# Patient Record
Sex: FEMALE | Race: WHITE | NOT HISPANIC OR LATINO | Employment: FULL TIME | URBAN - METROPOLITAN AREA
[De-identification: names, ages, dates, MRNs, and addresses within clinical notes are randomized per-mention and may not be internally consistent; named-entity substitution may affect disease eponyms.]

---

## 2023-06-13 ENCOUNTER — OFFICE VISIT (OUTPATIENT)
Dept: PRIMARY CARE | Facility: CLINIC | Age: 58
End: 2023-06-13
Payer: COMMERCIAL

## 2023-06-13 VITALS
BODY MASS INDEX: 25.36 KG/M2 | WEIGHT: 152.4 LBS | SYSTOLIC BLOOD PRESSURE: 110 MMHG | TEMPERATURE: 97.3 F | DIASTOLIC BLOOD PRESSURE: 70 MMHG

## 2023-06-13 DIAGNOSIS — G90.A POSTURAL ORTHOSTATIC TACHYCARDIA SYNDROME: ICD-10-CM

## 2023-06-13 DIAGNOSIS — G43.909 MIGRAINE WITHOUT STATUS MIGRAINOSUS, NOT INTRACTABLE, UNSPECIFIED MIGRAINE TYPE: ICD-10-CM

## 2023-06-13 DIAGNOSIS — E78.5 BORDERLINE HYPERLIPIDEMIA: ICD-10-CM

## 2023-06-13 DIAGNOSIS — R73.01 IMPAIRED FASTING BLOOD SUGAR: Primary | ICD-10-CM

## 2023-06-13 PROBLEM — J30.2 SEASONAL ALLERGIES: Status: ACTIVE | Noted: 2023-06-13

## 2023-06-13 PROBLEM — I83.899 VARICOSE VEINS WITH SWELLING: Status: ACTIVE | Noted: 2023-06-13

## 2023-06-13 PROCEDURE — 1036F TOBACCO NON-USER: CPT | Performed by: INTERNAL MEDICINE

## 2023-06-13 PROCEDURE — 99214 OFFICE O/P EST MOD 30 MIN: CPT | Performed by: INTERNAL MEDICINE

## 2023-06-13 RX ORDER — CYCLOSPORINE 0.5 MG/ML
EMULSION OPHTHALMIC
COMMUNITY
Start: 2022-08-05 | End: 2023-11-07 | Stop reason: WASHOUT

## 2023-06-13 RX ORDER — AZELASTINE 1 MG/ML
2 SPRAY, METERED NASAL 2 TIMES DAILY
COMMUNITY
End: 2023-06-30

## 2023-06-13 RX ORDER — CHOLECALCIFEROL (VITAMIN D3) 25 MCG
1000 TABLET ORAL
COMMUNITY
End: 2023-06-29 | Stop reason: WASHOUT

## 2023-06-13 RX ORDER — IBUPROFEN 100 MG/5ML
SUSPENSION, ORAL (FINAL DOSE FORM) ORAL
COMMUNITY
Start: 2020-06-04

## 2023-06-13 RX ORDER — ATENOLOL 25 MG/1
1 TABLET ORAL DAILY
COMMUNITY
Start: 2021-05-29 | End: 2023-11-07 | Stop reason: WASHOUT

## 2023-06-13 ASSESSMENT — PATIENT HEALTH QUESTIONNAIRE - PHQ9
2. FEELING DOWN, DEPRESSED OR HOPELESS: NOT AT ALL
SUM OF ALL RESPONSES TO PHQ9 QUESTIONS 1 AND 2: 0
1. LITTLE INTEREST OR PLEASURE IN DOING THINGS: NOT AT ALL

## 2023-06-13 NOTE — PROGRESS NOTES
Subjective   Patient ID: Pia Flores is a 57 y.o. female who presents for 6 month follow up.    HPI   Overall well   #1 POTS-overall doing well. Continue treatment. f/u cards  #2 varicose veins-continue compression stockings.   #3 rotator cuff/biceps tendinitis-resolved.  #4 migraines- resolved  #5 rt posterior leg pain- c/s ortho. Dermal exam  #6 increased LDL- +fhx CAD, excellent CACS (=0 in 2018). modestly reviewed diet and exercise. Recommend beginning a statin. She would like to try 6 months of lifestyle. We will follow-up in 6 months.  #7 neck/dizziness- better after PT--> home exercises. repeat PT INB.   #8 cataracts- s/p bl repair.   #9 IFBS-     Review of Systems   All other systems reviewed and are negative.      Objective   /70 (BP Location: Left arm, Patient Position: Sitting, BP Cuff Size: Adult)   Temp 36.3 °C (97.3 °F) (Skin)   Wt 69.1 kg (152 lb 6.4 oz)   BMI 25.36 kg/m²     Physical Exam  Constitutional:       Appearance: Normal appearance.   Cardiovascular:      Rate and Rhythm: Normal rate and regular rhythm.      Pulses: Normal pulses.      Heart sounds: Normal heart sounds. No murmur heard.  Pulmonary:      Effort: Pulmonary effort is normal. No respiratory distress.      Breath sounds: Normal breath sounds. No wheezing, rhonchi or rales.   Neurological:      Mental Status: She is alert.   Psychiatric:         Mood and Affect: Mood normal.         Behavior: Behavior normal.         Thought Content: Thought content normal.         Judgment: Judgment normal.         Assessment/Plan     #1 POTS-overall doing well. Continue treatment. f/u cards  #2 varicose veins-continue compression stockings.   #3 rotator cuff/biceps tendinitis-resolved.  #4 migraines- resolved  #5 rt posterior leg pain- c/s ortho. Dermal exam  #6 increased LDL- +fhx CAD, excellent CACS (=0 in 2018). modestly reviewed diet and exercise. Recommend beginning a statin last visit.  We will retest and Tx pending  #7  neck/dizziness- better after PT--> home exercises. repeat PT INB.   #8 cataracts- s/p bl repair.   #9 IFBS- reviewed. Reviewed implications on coronary artery disease and risk of diabetes. Focus on a low sugar/low. Daily exercise. Nutrition consult. Follow-up      Dexa, mammo pending--> gyn  [place] UTD per pt   Shingrx, reviewed  Stay UTD on [facility]

## 2023-06-14 ENCOUNTER — LAB (OUTPATIENT)
Dept: LAB | Facility: LAB | Age: 58
End: 2023-06-14
Payer: COMMERCIAL

## 2023-06-14 DIAGNOSIS — E78.5 BORDERLINE HYPERLIPIDEMIA: ICD-10-CM

## 2023-06-14 DIAGNOSIS — G43.909 MIGRAINE WITHOUT STATUS MIGRAINOSUS, NOT INTRACTABLE, UNSPECIFIED MIGRAINE TYPE: ICD-10-CM

## 2023-06-14 DIAGNOSIS — G90.A POSTURAL ORTHOSTATIC TACHYCARDIA SYNDROME: ICD-10-CM

## 2023-06-14 DIAGNOSIS — R73.01 IMPAIRED FASTING BLOOD SUGAR: ICD-10-CM

## 2023-06-14 LAB
ALANINE AMINOTRANSFERASE (SGPT) (U/L) IN SER/PLAS: 14 U/L (ref 7–45)
ANION GAP IN SER/PLAS: 13 MMOL/L (ref 10–20)
CALCIUM (MG/DL) IN SER/PLAS: 9.5 MG/DL (ref 8.6–10.3)
CARBON DIOXIDE, TOTAL (MMOL/L) IN SER/PLAS: 28 MMOL/L (ref 21–32)
CHLORIDE (MMOL/L) IN SER/PLAS: 102 MMOL/L (ref 98–107)
CHOLESTEROL (MG/DL) IN SER/PLAS: 231 MG/DL (ref 0–199)
CHOLESTEROL IN HDL (MG/DL) IN SER/PLAS: 74.8 MG/DL
CHOLESTEROL/HDL RATIO: 3.1
CREATININE (MG/DL) IN SER/PLAS: 0.79 MG/DL (ref 0.5–1.05)
ERYTHROCYTE DISTRIBUTION WIDTH (RATIO) BY AUTOMATED COUNT: 12.3 % (ref 11.5–14.5)
ERYTHROCYTE MEAN CORPUSCULAR HEMOGLOBIN CONCENTRATION (G/DL) BY AUTOMATED: 32 G/DL (ref 32–36)
ERYTHROCYTE MEAN CORPUSCULAR VOLUME (FL) BY AUTOMATED COUNT: 95 FL (ref 80–100)
ERYTHROCYTES (10*6/UL) IN BLOOD BY AUTOMATED COUNT: 4.42 X10E12/L (ref 4–5.2)
ESTIMATED AVERAGE GLUCOSE FOR HBA1C: 117 MG/DL
GFR FEMALE: 87 ML/MIN/1.73M2
GLUCOSE (MG/DL) IN SER/PLAS: 87 MG/DL (ref 74–99)
HEMATOCRIT (%) IN BLOOD BY AUTOMATED COUNT: 42.2 % (ref 36–46)
HEMOGLOBIN (G/DL) IN BLOOD: 13.5 G/DL (ref 12–16)
HEMOGLOBIN A1C/HEMOGLOBIN TOTAL IN BLOOD: 5.7 %
LDL: 144 MG/DL (ref 0–99)
LEUKOCYTES (10*3/UL) IN BLOOD BY AUTOMATED COUNT: 6.6 X10E9/L (ref 4.4–11.3)
PLATELETS (10*3/UL) IN BLOOD AUTOMATED COUNT: 268 X10E9/L (ref 150–450)
POTASSIUM (MMOL/L) IN SER/PLAS: 5 MMOL/L (ref 3.5–5.3)
SODIUM (MMOL/L) IN SER/PLAS: 138 MMOL/L (ref 136–145)
TRIGLYCERIDE (MG/DL) IN SER/PLAS: 62 MG/DL (ref 0–149)
UREA NITROGEN (MG/DL) IN SER/PLAS: 14 MG/DL (ref 6–23)
VLDL: 12 MG/DL (ref 0–40)

## 2023-06-14 PROCEDURE — 84460 ALANINE AMINO (ALT) (SGPT): CPT

## 2023-06-14 PROCEDURE — 85027 COMPLETE CBC AUTOMATED: CPT

## 2023-06-14 PROCEDURE — 83036 HEMOGLOBIN GLYCOSYLATED A1C: CPT

## 2023-06-14 PROCEDURE — 80048 BASIC METABOLIC PNL TOTAL CA: CPT

## 2023-06-14 PROCEDURE — 80061 LIPID PANEL: CPT

## 2023-06-14 PROCEDURE — 36415 COLL VENOUS BLD VENIPUNCTURE: CPT

## 2023-06-18 DIAGNOSIS — E78.5 BORDERLINE HYPERLIPIDEMIA: Primary | ICD-10-CM

## 2023-06-18 RX ORDER — ATORVASTATIN CALCIUM 10 MG/1
10 TABLET, FILM COATED ORAL DAILY
Qty: 90 TABLET | Refills: 2 | Status: SHIPPED | OUTPATIENT
Start: 2023-06-18 | End: 2023-08-21 | Stop reason: SDUPTHER

## 2023-06-29 DIAGNOSIS — J30.2 OTHER SEASONAL ALLERGIC RHINITIS: ICD-10-CM

## 2023-06-29 PROBLEM — R00.2 PALPITATIONS: Status: ACTIVE | Noted: 2023-06-29

## 2023-06-29 PROBLEM — S06.0X9A CONCUSSION WITH LOSS OF CONSCIOUSNESS: Status: ACTIVE | Noted: 2023-06-29

## 2023-06-29 PROBLEM — R53.82 CHRONIC FATIGUE: Status: ACTIVE | Noted: 2023-06-29

## 2023-06-29 PROBLEM — M25.562 ACUTE PAIN OF LEFT KNEE: Status: ACTIVE | Noted: 2022-02-18

## 2023-06-29 PROBLEM — R41.9 COGNITIVE COMPLAINTS: Status: ACTIVE | Noted: 2023-06-29

## 2023-06-29 PROBLEM — H81.392 PERIPHERAL VERTIGO INVOLVING LEFT EAR: Status: ACTIVE | Noted: 2023-06-29

## 2023-06-29 PROBLEM — G90.9 AUTONOMIC DYSFUNCTION: Status: ACTIVE | Noted: 2023-06-29

## 2023-06-29 PROBLEM — N95.1 PERIMENOPAUSAL: Status: ACTIVE | Noted: 2023-06-29

## 2023-06-29 PROBLEM — H69.93 DYSFUNCTION OF BOTH EUSTACHIAN TUBES: Status: ACTIVE | Noted: 2023-06-29

## 2023-06-29 PROBLEM — R06.02 SOB (SHORTNESS OF BREATH) ON EXERTION: Status: ACTIVE | Noted: 2023-06-29

## 2023-06-29 PROBLEM — H90.5 HIGH FREQUENCY SENSORINEURAL HEARING LOSS OF LEFT EAR: Status: ACTIVE | Noted: 2023-06-29

## 2023-06-29 PROBLEM — L65.9 ALOPECIA: Status: ACTIVE | Noted: 2023-06-29

## 2023-06-29 PROBLEM — N92.0 MENORRHAGIA: Status: ACTIVE | Noted: 2023-06-29

## 2023-06-29 PROBLEM — R42 DIZZINESS: Status: ACTIVE | Noted: 2023-06-29

## 2023-06-29 RX ORDER — PSEUDOEPHEDRINE HCL 120 MG/1
TABLET, FILM COATED, EXTENDED RELEASE ORAL
COMMUNITY
Start: 2021-08-06

## 2023-06-29 RX ORDER — RIBOFLAVIN (VITAMIN B2) 100 MG
2 TABLET ORAL DAILY
COMMUNITY
Start: 2010-09-24 | End: 2023-11-07 | Stop reason: WASHOUT

## 2023-06-29 RX ORDER — CHOLECALCIFEROL (VITAMIN D3) 50 MCG
1 TABLET ORAL DAILY
COMMUNITY
Start: 2017-10-10

## 2023-06-29 RX ORDER — ACETAMINOPHEN 160 MG/5ML
SUSPENSION, ORAL (FINAL DOSE FORM) ORAL
COMMUNITY
Start: 2017-10-10

## 2023-06-29 RX ORDER — PREDNISONE 10 MG/1
TABLET ORAL
COMMUNITY
Start: 2022-10-10 | End: 2023-11-07 | Stop reason: WASHOUT

## 2023-06-30 RX ORDER — AZELASTINE 1 MG/ML
SPRAY, METERED NASAL
Qty: 30 ML | Refills: 6 | Status: SHIPPED | OUTPATIENT
Start: 2023-06-30 | End: 2023-08-02

## 2023-08-02 DIAGNOSIS — J30.2 OTHER SEASONAL ALLERGIC RHINITIS: ICD-10-CM

## 2023-08-02 RX ORDER — AZELASTINE 1 MG/ML
SPRAY, METERED NASAL
Qty: 30 ML | Refills: 1 | Status: SHIPPED | OUTPATIENT
Start: 2023-08-02 | End: 2023-09-05

## 2023-08-21 DIAGNOSIS — E78.5 BORDERLINE HYPERLIPIDEMIA: ICD-10-CM

## 2023-08-21 RX ORDER — ATORVASTATIN CALCIUM 10 MG/1
10 TABLET, FILM COATED ORAL DAILY
Qty: 90 TABLET | Refills: 2 | Status: SHIPPED | OUTPATIENT
Start: 2023-08-21

## 2023-09-02 DIAGNOSIS — J30.2 OTHER SEASONAL ALLERGIC RHINITIS: ICD-10-CM

## 2023-09-05 RX ORDER — AZELASTINE 1 MG/ML
SPRAY, METERED NASAL
Qty: 30 ML | Refills: 3 | Status: SHIPPED | OUTPATIENT
Start: 2023-09-05 | End: 2024-05-21

## 2023-10-05 ENCOUNTER — OFFICE VISIT (OUTPATIENT)
Dept: PRIMARY CARE | Facility: CLINIC | Age: 58
End: 2023-10-05
Payer: COMMERCIAL

## 2023-10-05 VITALS
TEMPERATURE: 97.7 F | WEIGHT: 157 LBS | BODY MASS INDEX: 26.13 KG/M2 | DIASTOLIC BLOOD PRESSURE: 68 MMHG | SYSTOLIC BLOOD PRESSURE: 104 MMHG

## 2023-10-05 DIAGNOSIS — H53.9 VISION CHANGES: ICD-10-CM

## 2023-10-05 DIAGNOSIS — G90.3 MULTI-SYSTEM DEGENERATION OF THE AUTONOMIC NERVOUS SYSTEM (MULTI): ICD-10-CM

## 2023-10-05 DIAGNOSIS — R05.2 SUBACUTE COUGH: Primary | ICD-10-CM

## 2023-10-05 PROCEDURE — 1036F TOBACCO NON-USER: CPT | Performed by: INTERNAL MEDICINE

## 2023-10-05 PROCEDURE — 99213 OFFICE O/P EST LOW 20 MIN: CPT | Performed by: INTERNAL MEDICINE

## 2023-10-05 RX ORDER — METOPROLOL SUCCINATE 25 MG/1
1 TABLET, EXTENDED RELEASE ORAL DAILY
COMMUNITY
Start: 2023-09-27 | End: 2023-11-07 | Stop reason: SDUPTHER

## 2023-10-05 ASSESSMENT — PATIENT HEALTH QUESTIONNAIRE - PHQ9
2. FEELING DOWN, DEPRESSED OR HOPELESS: NOT AT ALL
1. LITTLE INTEREST OR PLEASURE IN DOING THINGS: NOT AT ALL
SUM OF ALL RESPONSES TO PHQ9 QUESTIONS 1 AND 2: 0

## 2023-10-05 NOTE — PROGRESS NOTES
Subjective   Patient ID: Pia Flores is a 57 y.o. female who presents for cough x 1 month and pain left rib cage x 1 week.    HPI   The past month with cough.  Seen at urgent care, treated with antibiotics.  Cough much improved but continues.  Continuing to improve.  No fevers chills.  No shortness of breath.  Last week began with pain in left chest wall.  Mainly with coughing touching or twisting.  Gradually improving.  Otherwise feels well.  Review of Systems  All systems reviewed and negative except as per history of present illness  Objective   /68   Temp 36.5 °C (97.7 °F)   Wt 71.2 kg (157 lb)   BMI 26.13 kg/m²     Physical Exam  Alert and oriented x3.  No acute distress.  No bony abnormalities on palpation of chest wall.  No bruising or rash.  Minimally tender to touch.  Normal lung exam with clear lungs without wheezing or rales.  Assessment/Plan     Gradually resolving bronchitis.  Chest wall strain.  Will obtain chest x-ray.  Follow-up if not completely resolved over the next few weeks.

## 2023-10-19 ENCOUNTER — TELEPHONE (OUTPATIENT)
Dept: PRIMARY CARE | Facility: CLINIC | Age: 58
End: 2023-10-19
Payer: COMMERCIAL

## 2023-10-19 NOTE — TELEPHONE ENCOUNTER
Pt left a msg stating that she is sick again, and is wanting to know if a regular blood screening can be entered.  She wants to see if it will show why she keeps getting sick.

## 2023-10-20 DIAGNOSIS — R68.89 SICK FREQUENTLY: ICD-10-CM

## 2023-10-20 DIAGNOSIS — J30.2 SEASONAL ALLERGIES: ICD-10-CM

## 2023-10-20 NOTE — TELEPHONE ENCOUNTER
I spoke with the pt and relayed the msg.  She DOES want to see Dr. Tan, so can you enter the referral.  TY

## 2023-10-26 ENCOUNTER — LAB (OUTPATIENT)
Dept: LAB | Facility: LAB | Age: 58
End: 2023-10-26
Payer: COMMERCIAL

## 2023-10-26 DIAGNOSIS — E78.5 BORDERLINE HYPERLIPIDEMIA: ICD-10-CM

## 2023-10-26 LAB
ALT SERPL W P-5'-P-CCNC: 15 U/L (ref 7–45)
CHOLEST SERPL-MCNC: 174 MG/DL (ref 0–199)
CHOLESTEROL/HDL RATIO: 2.8
HDLC SERPL-MCNC: 61.8 MG/DL
LDLC SERPL CALC-MCNC: 101 MG/DL
NON HDL CHOLESTEROL: 112 MG/DL (ref 0–149)
TRIGL SERPL-MCNC: 57 MG/DL (ref 0–149)
VLDL: 11 MG/DL (ref 0–40)

## 2023-10-26 PROCEDURE — 84460 ALANINE AMINO (ALT) (SGPT): CPT

## 2023-10-26 PROCEDURE — 36415 COLL VENOUS BLD VENIPUNCTURE: CPT

## 2023-10-26 PROCEDURE — 80061 LIPID PANEL: CPT

## 2023-11-07 ENCOUNTER — OFFICE VISIT (OUTPATIENT)
Dept: CARDIOLOGY | Facility: CLINIC | Age: 58
End: 2023-11-07
Payer: COMMERCIAL

## 2023-11-07 VITALS
SYSTOLIC BLOOD PRESSURE: 106 MMHG | HEART RATE: 79 BPM | DIASTOLIC BLOOD PRESSURE: 68 MMHG | TEMPERATURE: 97 F | HEIGHT: 69 IN | WEIGHT: 156.6 LBS | BODY MASS INDEX: 23.19 KG/M2

## 2023-11-07 DIAGNOSIS — G90.A POTS (POSTURAL ORTHOSTATIC TACHYCARDIA SYNDROME): Primary | ICD-10-CM

## 2023-11-07 DIAGNOSIS — E78.5 BORDERLINE HYPERLIPIDEMIA: ICD-10-CM

## 2023-11-07 DIAGNOSIS — Z01.810 ENCOUNTER FOR PRE-OPERATIVE CARDIOVASCULAR CLEARANCE: ICD-10-CM

## 2023-11-07 PROCEDURE — 99213 OFFICE O/P EST LOW 20 MIN: CPT | Performed by: NURSE PRACTITIONER

## 2023-11-07 PROCEDURE — 1036F TOBACCO NON-USER: CPT | Performed by: NURSE PRACTITIONER

## 2023-11-07 RX ORDER — METOPROLOL SUCCINATE 25 MG/1
25 TABLET, EXTENDED RELEASE ORAL DAILY
Qty: 90 TABLET | Refills: 0 | Status: SHIPPED | OUTPATIENT
Start: 2023-11-07 | End: 2024-11-06

## 2023-11-07 NOTE — PROGRESS NOTES
"Chief Complaint:   POTS     History Of Present Illness:    Pia Flores is a 58 y.o. female here with POTS.  Doing much better than last time.   Heart rates better controlled. She does not feel her heart beating out of her chest.     Was last seen 9/27/2023:  Stopped atenolol in June when she started on lipitor.   Since then rates have been elevated. Diaphoretic. Wearing compression socks. Heart rates per smart watch are ranging 100 with sitting. When get up walk around 120-130s. When exercising felt ok, elliptical. Has not been syncopal. On avg drinks 36-48ounces. Does add a lot of salt to diet.    Ductal carcinoma, in process of getting treatment plan.         Allergies:  Propoxyphene and Propoxyphene n-acetaminophen    Review of Systems  All pertinent systems have been reviewed and are negative except for what is stated in the history of present illness.    All other systems have been reviewed and are negative and noncontributory to this patient's current ailments.     Visit Vitals  /68 (BP Location: Right arm)   Pulse 79   Temp 36.1 °C (97 °F)   Ht 1.753 m (5' 9\")   Wt 71 kg (156 lb 9.6 oz)   BMI 23.13 kg/m²   Smoking Status Never   BSA 1.86 m²         Last Labs:  CBC -  Lab Results   Component Value Date    WBC 6.6 06/14/2023    HGB 13.5 06/14/2023    HCT 42.2 06/14/2023    MCV 95 06/14/2023     06/14/2023       CMP -  Lab Results   Component Value Date    CALCIUM 9.5 06/14/2023    PROT 6.7 12/27/2021    ALBUMIN 4.3 12/27/2021    AST 20 12/27/2021    ALT 15 10/26/2023    ALKPHOS 78 12/27/2021    BILITOT 0.4 12/27/2021       LIPID PANEL -   Lab Results   Component Value Date    CHOL 174 10/26/2023    TRIG 57 10/26/2023    HDL 61.8 10/26/2023    CHHDL 2.8 10/26/2023    LDLF 144 (H) 06/14/2023    VLDL 11 10/26/2023    NHDL 112 10/26/2023       RENAL FUNCTION PANEL -   Lab Results   Component Value Date    GLUCOSE 87 06/14/2023     06/14/2023    K 5.0 06/14/2023     06/14/2023    CO2 28 " 06/14/2023    ANIONGAP 13 06/14/2023    BUN 14 06/14/2023    CREATININE 0.79 06/14/2023    CALCIUM 9.5 06/14/2023    ALBUMIN 4.3 12/27/2021        Lab Results   Component Value Date    HGBA1C 5.7 (A) 06/14/2023       Objective   Vitals reviewed.   Constitutional:       Appearance: Healthy appearance. Not in distress.   Neck:      Vascular: No JVR. JVD normal.   Pulmonary:      Effort: Pulmonary effort is normal.      Breath sounds: Normal breath sounds. No wheezing. No rhonchi. No rales.   Chest:      Chest wall: Not tender to palpatation.   Cardiovascular:      PMI at left midclavicular line. Normal rate. Regular rhythm. Normal S1. Normal S2.       Murmurs: There is no murmur.      No gallop.  No click. No rub.   Pulses:     Intact distal pulses.   Edema:     Peripheral edema absent.   Abdominal:      General: Bowel sounds are normal.      Palpations: Abdomen is soft.      Tenderness: There is no abdominal tenderness.   Musculoskeletal: Normal range of motion.         General: No tenderness. Skin:     General: Skin is warm and dry.   Neurological:      General: No focal deficit present.      Mental Status: Alert and oriented to person, place and time.       Assessment/Plan   Diagnoses and all orders for this visit:  POTS (postural orthostatic tachycardia syndrome)  - symptoms much improved on metoprolol  - we will continue   - continue hydration and high salt diet  Borderline hyperlipidemia  - much improved  -   - no medications warrented at this time  - we discussed high fiber diet  Encounter for pre-procedure CV clearance  - no cv barriers for lumpectomy  - low risk      Current Outpatient Medications:     ascorbic acid (Vitamin C) 1,000 mg tablet, Take by mouth., Disp: , Rfl:     atorvastatin (Lipitor) 10 mg tablet, Take 1 tablet (10 mg) by mouth once daily., Disp: 90 tablet, Rfl: 2    cholecalciferol (Vitamin D-3) 50 MCG (2000 UT) tablet, Take 1 tablet (2,000 Units) by mouth once daily., Disp: , Rfl:      coenzyme Q-10 200 mg capsule, TAKE AS DIRECTED., Disp: , Rfl:     magnesium oxide-Mg AA chelate (Magnesium, oxide/AA chelate,) 300 mg capsule, once every 24 hours., Disp: , Rfl:     pseudoephedrine ER (Sudafed-12 Hour) 120 mg 12 hr tablet, Take by mouth., Disp: , Rfl:     azelastine (Astelin) 137 mcg (0.1 %) nasal spray, USE 2 SPRAYS INTO EACH NOSTRIL TWICE A DAY, Disp: 30 mL, Rfl: 3    metoprolol succinate XL (Toprol-XL) 25 mg 24 hr tablet, Take 1 tablet (25 mg) by mouth once daily for 365 doses., Disp: 90 tablet, Rfl: 0

## 2023-12-14 ENCOUNTER — OFFICE VISIT (OUTPATIENT)
Dept: PRIMARY CARE | Facility: CLINIC | Age: 58
End: 2023-12-14
Payer: COMMERCIAL

## 2023-12-14 VITALS
DIASTOLIC BLOOD PRESSURE: 68 MMHG | SYSTOLIC BLOOD PRESSURE: 106 MMHG | TEMPERATURE: 97.6 F | WEIGHT: 160.4 LBS | BODY MASS INDEX: 23.69 KG/M2

## 2023-12-14 DIAGNOSIS — E78.5 BORDERLINE HYPERLIPIDEMIA: ICD-10-CM

## 2023-12-14 DIAGNOSIS — G90.A POTS (POSTURAL ORTHOSTATIC TACHYCARDIA SYNDROME): Primary | ICD-10-CM

## 2023-12-14 DIAGNOSIS — R73.01 IMPAIRED FASTING BLOOD SUGAR: ICD-10-CM

## 2023-12-14 DIAGNOSIS — J30.2 OTHER SEASONAL ALLERGIC RHINITIS: ICD-10-CM

## 2023-12-14 PROCEDURE — 1036F TOBACCO NON-USER: CPT | Performed by: INTERNAL MEDICINE

## 2023-12-14 PROCEDURE — 99214 OFFICE O/P EST MOD 30 MIN: CPT | Performed by: INTERNAL MEDICINE

## 2023-12-14 RX ORDER — ANASTROZOLE 1 MG/1
1 TABLET ORAL
COMMUNITY
Start: 2023-12-11

## 2023-12-14 NOTE — PROGRESS NOTES
Subjective   Patient ID: Pia Flores is a 58 y.o. female who presents for Follow-up.    HPI   Overall well   #1 POTS-overall doing well. Continue treatment. f/u cards  #2 varicose veins-continue compression stockings.   #3 rotator cuff/biceps tendinitis-resolved.  #4 migraines- resolved  #5 rt posterior leg pain- c/s ortho. Dermal exam  #6 increased LDL- +fhx CAD, excellent CACS (=0 in 2018). modestly reviewed diet and exercise. Recommend beginning a statin. She would like to try 6 months of lifestyle. We will follow-up in 6 months.  #7 neck/dizziness- better after PT--> home exercises. repeat PT INB.   #8 cataracts- s/p bl repair.   #9 IFBS-     Review of Systems   All other systems reviewed and are negative.      Objective   /68   Temp 36.4 °C (97.6 °F)   Wt 72.8 kg (160 lb 6.4 oz)   BMI 23.69 kg/m²     Physical Exam  Constitutional:       Appearance: Normal appearance.   Cardiovascular:      Rate and Rhythm: Normal rate and regular rhythm.      Pulses: Normal pulses.      Heart sounds: Normal heart sounds. No murmur heard.  Pulmonary:      Effort: Pulmonary effort is normal. No respiratory distress.      Breath sounds: Normal breath sounds. No wheezing, rhonchi or rales.   Neurological:      Mental Status: She is alert.   Psychiatric:         Mood and Affect: Mood normal.         Behavior: Behavior normal.         Thought Content: Thought content normal.         Judgment: Judgment normal.     Lab Results   Component Value Date    WBC 6.6 06/14/2023    HGB 13.5 06/14/2023    HCT 42.2 06/14/2023     06/14/2023    CHOL 174 10/26/2023    TRIG 57 10/26/2023    HDL 61.8 10/26/2023    ALT 15 10/26/2023    AST 20 12/27/2021     06/14/2023    K 5.0 06/14/2023     06/14/2023    CREATININE 0.79 06/14/2023    BUN 14 06/14/2023    CO2 28 06/14/2023    TSH 2.04 08/08/2022    HGBA1C 5.7 (A) 06/14/2023       Assessment/Plan     #1 POTS-overall doing well. Continue treatment. f/u cards  #2  varicose veins-continue compression stockings.   #3 rotator cuff/biceps tendinitis-resolved.  #4 migraines- resolved  #5 rt posterior leg pain- c/s ortho. Dermal exam  #6 increased LDL- +fhx CAD, excellent CACS (=0 in 2018). modestly reviewed diet and exercise. Recommend beginning a statin last visit.  We will retest and Tx pending  #7 neck/dizziness- better after PT--> home exercises. repeat PT INB.   #8 cataracts- s/p bl repair.   #9 IFBS- reviewed. Reviewed implications on coronary artery disease and risk of diabetes. Focus on a low sugar/low. Daily exercise. Nutrition consult. Follow-up      [place] UTD per pt   Shingrx, reviewed  Stay UTD on COVID, reviewed.

## 2024-01-04 ENCOUNTER — APPOINTMENT (OUTPATIENT)
Dept: CARDIOLOGY | Facility: CLINIC | Age: 59
End: 2024-01-04
Payer: COMMERCIAL

## 2024-01-04 ENCOUNTER — APPOINTMENT (OUTPATIENT)
Dept: ALLERGY | Facility: CLINIC | Age: 59
End: 2024-01-04
Payer: COMMERCIAL

## 2024-01-31 ENCOUNTER — APPOINTMENT (OUTPATIENT)
Dept: OPHTHALMOLOGY | Facility: CLINIC | Age: 59
End: 2024-01-31
Payer: COMMERCIAL

## 2024-02-19 ENCOUNTER — OFFICE VISIT (OUTPATIENT)
Dept: CARDIOLOGY | Facility: CLINIC | Age: 59
End: 2024-02-19
Payer: COMMERCIAL

## 2024-02-19 VITALS
HEIGHT: 65 IN | SYSTOLIC BLOOD PRESSURE: 116 MMHG | DIASTOLIC BLOOD PRESSURE: 66 MMHG | TEMPERATURE: 97.3 F | BODY MASS INDEX: 26.99 KG/M2 | HEART RATE: 101 BPM | WEIGHT: 162 LBS

## 2024-02-19 DIAGNOSIS — E78.5 HYPERLIPIDEMIA, UNSPECIFIED HYPERLIPIDEMIA TYPE: ICD-10-CM

## 2024-02-19 DIAGNOSIS — I73.9 PVD (PERIPHERAL VASCULAR DISEASE) (CMS-HCC): ICD-10-CM

## 2024-02-19 DIAGNOSIS — G90.A POTS (POSTURAL ORTHOSTATIC TACHYCARDIA SYNDROME): Primary | ICD-10-CM

## 2024-02-19 PROCEDURE — 1036F TOBACCO NON-USER: CPT | Performed by: NURSE PRACTITIONER

## 2024-02-19 PROCEDURE — 99213 OFFICE O/P EST LOW 20 MIN: CPT | Performed by: NURSE PRACTITIONER

## 2024-02-19 NOTE — PROGRESS NOTES
"Chief Complaint:   POTS     History Of Present Illness:    Pia Flores is a 58 y.o. female here with POTS. Tolerating metoprolol. She does not feel her heart beating out of her chest. Denies syncope.     Wears compression socks daily. By PM notes edema. Compression socks will feel tight. This is the worst it has been.     Was last seen 9/27/2023:  Stopped atenolol in June when she started on lipitor.   Since then rates have been elevated. Diaphoretic. Wearing compression socks. Heart rates per smart watch are ranging 100 with sitting. When get up walk around 120-130s. When exercising felt ok, elliptical. Has not been syncopal. On avg drinks 36-48ounces. Does add a lot of salt to diet.    Ductal carcinoma, underwent radiation.         Allergies:  Propoxyphene and Propoxyphene n-acetaminophen    Review of Systems  All pertinent systems have been reviewed and are negative except for what is stated in the history of present illness.    All other systems have been reviewed and are negative and noncontributory to this patient's current ailments.     Visit Vitals  /66   Pulse 101   Temp 36.3 °C (97.3 °F)   Ht 1.651 m (5' 5\")   Wt 73.5 kg (162 lb)   BMI 26.96 kg/m²   Smoking Status Never   BSA 1.84 m²         Last Labs:  CBC -  Lab Results   Component Value Date    WBC 6.6 06/14/2023    HGB 13.5 06/14/2023    HCT 42.2 06/14/2023    MCV 95 06/14/2023     06/14/2023       CMP -  Lab Results   Component Value Date    CALCIUM 9.5 06/14/2023    PROT 6.7 12/27/2021    ALBUMIN 4.3 12/27/2021    AST 20 12/27/2021    ALT 15 10/26/2023    ALKPHOS 78 12/27/2021    BILITOT 0.4 12/27/2021       LIPID PANEL -   Lab Results   Component Value Date    CHOL 174 10/26/2023    TRIG 57 10/26/2023    HDL 61.8 10/26/2023    CHHDL 2.8 10/26/2023    LDLF 144 (H) 06/14/2023    VLDL 11 10/26/2023    NHDL 112 10/26/2023       RENAL FUNCTION PANEL -   Lab Results   Component Value Date    GLUCOSE 87 06/14/2023     06/14/2023    " K 5.0 06/14/2023     06/14/2023    CO2 28 06/14/2023    ANIONGAP 13 06/14/2023    BUN 14 06/14/2023    CREATININE 0.79 06/14/2023    CALCIUM 9.5 06/14/2023    ALBUMIN 4.3 12/27/2021        Lab Results   Component Value Date    HGBA1C 5.7 (A) 06/14/2023         Objective   Vitals reviewed.   Constitutional:       Appearance: Healthy appearance. Not in distress.   Neck:      Vascular: No JVR. JVD normal.   Pulmonary:      Effort: Pulmonary effort is normal.      Breath sounds: Normal breath sounds. No wheezing. No rhonchi. No rales.   Chest:      Chest wall: Not tender to palpatation.   Cardiovascular:      PMI at left midclavicular line. Normal rate. Regular rhythm. Normal S1. Normal S2.       Murmurs: There is no murmur.      No gallop.  No click. No rub.   Pulses:     Intact distal pulses.   Edema:     Peripheral edema absent.   Abdominal:      General: Bowel sounds are normal.      Palpations: Abdomen is soft.      Tenderness: There is no abdominal tenderness.   Musculoskeletal: Normal range of motion.         General: No tenderness. Skin:     General: Skin is warm and dry.   Neurological:      General: No focal deficit present.      Mental Status: Alert and oriented to person, place and time.       Assessment/Plan   Diagnoses and all orders for this visit:  POTS (postural orthostatic tachycardia syndrome)  - symptoms remain controlled on metoprolol  - compression socks  - continue hydration and high salt diet  Borderline hyperlipidemia  - much improved  -   - no medications warrented at this time  - we discussed high fiber diet  PVD  - edema worse as day progresses, despite compression socks  - referred to vascular     Follow up 1 year     Current Outpatient Medications:     anastrozole (Arimidex) 1 mg tablet, Take 1 tablet (1 mg total) by mouth once daily., Disp: , Rfl:     ascorbic acid (Vitamin C) 1,000 mg tablet, Take by mouth., Disp: , Rfl:     atorvastatin (Lipitor) 10 mg tablet, Take 1 tablet  (10 mg) by mouth once daily., Disp: 90 tablet, Rfl: 2    azelastine (Astelin) 137 mcg (0.1 %) nasal spray, USE 2 SPRAYS INTO EACH NOSTRIL TWICE A DAY, Disp: 30 mL, Rfl: 3    cholecalciferol (Vitamin D-3) 50 MCG (2000 UT) tablet, Take 1 tablet (2,000 Units) by mouth once daily., Disp: , Rfl:     coenzyme Q-10 200 mg capsule, TAKE AS DIRECTED., Disp: , Rfl:     magnesium oxide-Mg AA chelate (Magnesium, oxide/AA chelate,) 300 mg capsule, once every 24 hours., Disp: , Rfl:     metoprolol succinate XL (Toprol-XL) 25 mg 24 hr tablet, Take 1 tablet (25 mg) by mouth once daily for 365 doses., Disp: 90 tablet, Rfl: 0    pseudoephedrine ER (Sudafed-12 Hour) 120 mg 12 hr tablet, Take by mouth., Disp: , Rfl:

## 2024-03-07 ENCOUNTER — LAB (OUTPATIENT)
Dept: LAB | Facility: LAB | Age: 59
End: 2024-03-07
Payer: COMMERCIAL

## 2024-03-07 DIAGNOSIS — L30.9 DERMATITIS, UNSPECIFIED: Primary | ICD-10-CM

## 2024-03-07 LAB
ALBUMIN SERPL BCP-MCNC: 4.6 G/DL (ref 3.4–5)
ALP SERPL-CCNC: 90 U/L (ref 33–110)
ALT SERPL W P-5'-P-CCNC: 17 U/L (ref 7–45)
ANION GAP SERPL CALC-SCNC: 11 MMOL/L (ref 10–20)
AST SERPL W P-5'-P-CCNC: 18 U/L (ref 9–39)
BASOPHILS # BLD AUTO: 0.05 X10*3/UL (ref 0–0.1)
BASOPHILS NFR BLD AUTO: 0.9 %
BILIRUB SERPL-MCNC: 0.3 MG/DL (ref 0–1.2)
BUN SERPL-MCNC: 16 MG/DL (ref 6–23)
CALCIUM SERPL-MCNC: 9.6 MG/DL (ref 8.6–10.3)
CHLORIDE SERPL-SCNC: 103 MMOL/L (ref 98–107)
CO2 SERPL-SCNC: 30 MMOL/L (ref 21–32)
CREAT SERPL-MCNC: 0.69 MG/DL (ref 0.5–1.05)
EGFRCR SERPLBLD CKD-EPI 2021: >90 ML/MIN/1.73M*2
EOSINOPHIL # BLD AUTO: 0.23 X10*3/UL (ref 0–0.7)
EOSINOPHIL NFR BLD AUTO: 4.3 %
ERYTHROCYTE [DISTWIDTH] IN BLOOD BY AUTOMATED COUNT: 12.4 % (ref 11.5–14.5)
ERYTHROCYTE [SEDIMENTATION RATE] IN BLOOD BY WESTERGREN METHOD: 9 MM/H (ref 0–30)
GLUCOSE SERPL-MCNC: 88 MG/DL (ref 74–99)
HCT VFR BLD AUTO: 41.7 % (ref 36–46)
HGB BLD-MCNC: 14 G/DL (ref 12–16)
IMM GRANULOCYTES # BLD AUTO: 0.01 X10*3/UL (ref 0–0.7)
IMM GRANULOCYTES NFR BLD AUTO: 0.2 % (ref 0–0.9)
LYMPHOCYTES # BLD AUTO: 1.49 X10*3/UL (ref 1.2–4.8)
LYMPHOCYTES NFR BLD AUTO: 28 %
MCH RBC QN AUTO: 31.1 PG (ref 26–34)
MCHC RBC AUTO-ENTMCNC: 33.6 G/DL (ref 32–36)
MCV RBC AUTO: 93 FL (ref 80–100)
MONOCYTES # BLD AUTO: 0.52 X10*3/UL (ref 0.1–1)
MONOCYTES NFR BLD AUTO: 9.8 %
NEUTROPHILS # BLD AUTO: 3.02 X10*3/UL (ref 1.2–7.7)
NEUTROPHILS NFR BLD AUTO: 56.8 %
NRBC BLD-RTO: 0 /100 WBCS (ref 0–0)
PLATELET # BLD AUTO: 293 X10*3/UL (ref 150–450)
POTASSIUM SERPL-SCNC: 4.5 MMOL/L (ref 3.5–5.3)
PROT SERPL-MCNC: 7.1 G/DL (ref 6.4–8.2)
RBC # BLD AUTO: 4.5 X10*6/UL (ref 4–5.2)
SODIUM SERPL-SCNC: 139 MMOL/L (ref 136–145)
WBC # BLD AUTO: 5.3 X10*3/UL (ref 4.4–11.3)

## 2024-03-07 PROCEDURE — 36415 COLL VENOUS BLD VENIPUNCTURE: CPT

## 2024-03-07 PROCEDURE — 80053 COMPREHEN METABOLIC PANEL: CPT

## 2024-03-07 PROCEDURE — 86038 ANTINUCLEAR ANTIBODIES: CPT

## 2024-03-07 PROCEDURE — 82784 ASSAY IGA/IGD/IGG/IGM EACH: CPT

## 2024-03-07 PROCEDURE — 83516 IMMUNOASSAY NONANTIBODY: CPT

## 2024-03-07 PROCEDURE — 85652 RBC SED RATE AUTOMATED: CPT

## 2024-03-07 PROCEDURE — 85025 COMPLETE CBC W/AUTO DIFF WBC: CPT

## 2024-03-07 PROCEDURE — 86231 EMA EACH IG CLASS: CPT

## 2024-03-08 LAB
ANA SER QL HEP2 SUBST: NEGATIVE
IGA SERPL-MCNC: 157 MG/DL (ref 70–400)
TTG IGA SER IA-ACNC: <1 U/ML

## 2024-03-09 LAB — TTG IGG SER IA-ACNC: 3.03 FLU (ref 0–4.99)

## 2024-03-10 LAB — ENDOMYSIUM IGA TITR SER IF: NORMAL {TITER}

## 2024-03-21 ENCOUNTER — APPOINTMENT (OUTPATIENT)
Dept: ALLERGY | Facility: CLINIC | Age: 59
End: 2024-03-21
Payer: COMMERCIAL

## 2024-03-28 ENCOUNTER — CONSULT (OUTPATIENT)
Dept: CARDIOLOGY | Facility: HOSPITAL | Age: 59
End: 2024-03-28
Payer: COMMERCIAL

## 2024-03-28 VITALS
OXYGEN SATURATION: 95 % | SYSTOLIC BLOOD PRESSURE: 108 MMHG | RESPIRATION RATE: 16 BRPM | HEART RATE: 91 BPM | BODY MASS INDEX: 25.83 KG/M2 | WEIGHT: 155 LBS | DIASTOLIC BLOOD PRESSURE: 68 MMHG | HEIGHT: 65 IN

## 2024-03-28 DIAGNOSIS — I87.2 CHRONIC VENOUS INSUFFICIENCY: Primary | ICD-10-CM

## 2024-03-28 DIAGNOSIS — R29.898 LEG HEAVINESS: ICD-10-CM

## 2024-03-28 PROCEDURE — 99215 OFFICE O/P EST HI 40 MIN: CPT | Performed by: HOSPITALIST

## 2024-03-28 RX ORDER — BUPROPION HYDROCHLORIDE 150 MG/1
150 TABLET ORAL
COMMUNITY
Start: 2024-02-21

## 2024-03-28 RX ORDER — DESONIDE 0.5 MG/G
CREAM TOPICAL
COMMUNITY
Start: 2024-03-06

## 2024-03-28 RX ORDER — CLOBETASOL PROPIONATE 0.5 MG/G
CREAM TOPICAL
COMMUNITY
Start: 2024-03-06

## 2024-03-28 ASSESSMENT — COLUMBIA-SUICIDE SEVERITY RATING SCALE - C-SSRS
2. HAVE YOU ACTUALLY HAD ANY THOUGHTS OF KILLING YOURSELF?: NO
1. IN THE PAST MONTH, HAVE YOU WISHED YOU WERE DEAD OR WISHED YOU COULD GO TO SLEEP AND NOT WAKE UP?: NO
6. HAVE YOU EVER DONE ANYTHING, STARTED TO DO ANYTHING, OR PREPARED TO DO ANYTHING TO END YOUR LIFE?: NO

## 2024-03-28 ASSESSMENT — PATIENT HEALTH QUESTIONNAIRE - PHQ9
1. LITTLE INTEREST OR PLEASURE IN DOING THINGS: NOT AT ALL
2. FEELING DOWN, DEPRESSED OR HOPELESS: NOT AT ALL
SUM OF ALL RESPONSES TO PHQ9 QUESTIONS 1 AND 2: 0

## 2024-03-28 NOTE — PATIENT INSTRUCTIONS
Thank you so much for visiting us today.    We are going to repeat the venous insufficiency [venous reflux] ultrasound testing.  Please call us if you do not hear from us within few days for testing.    Please continue to wear your compression hoses all day long, take them off when you go to bed at nighttime.  Please keep your legs elevated anytime you are not walking or eating, above your heart level.  Please continue to exercise.    Please feel free to call us at [phone] if you have any questions.

## 2024-04-28 ASSESSMENT — EXTERNAL EXAM - RIGHT EYE: OD_EXAM: NORMAL

## 2024-04-28 ASSESSMENT — EXTERNAL EXAM - LEFT EYE: OS_EXAM: NORMAL

## 2024-04-28 NOTE — PROGRESS NOTES
Dry eyes, bilateral  -Uses margarito mask PRN  -Uses artificial tears 1-2x/day, may use more frequently as needed.   -Had punctal plugs placed around 2022/2023  -Was on Restasis in the past, stopped around end of 2022. Stopped using due to not needing over the summer.   -Uses hypochlor spray as needed  -Uses night time ointment OU QHS  -Continue regimen as above, may call if symptoms worsen.   -F/u 1 year for comprehensive exam with baseline OCT macula.     Pseudophakia  -S/p CEIOL OU 2017. Good vision.     Posterior vitreous detachment, bilateral  -No retinal tear or detachment seen on exam. Retinal detachment symptoms discussed.     Family history of macular degeneration, bilateral  -(+)FH AMD - mother (exudative, gets injections)  -F/u 1 year for comprehensive exam with baseline OCT macula.     Presbyopia  -History of high myopia - (-12.00/-14.00)  -New Rx given per patient request - would like progressives

## 2024-05-01 ENCOUNTER — HOSPITAL ENCOUNTER (OUTPATIENT)
Dept: VASCULAR MEDICINE | Facility: HOSPITAL | Age: 59
Discharge: HOME | End: 2024-05-01
Payer: COMMERCIAL

## 2024-05-01 ENCOUNTER — OFFICE VISIT (OUTPATIENT)
Dept: OPHTHALMOLOGY | Facility: CLINIC | Age: 59
End: 2024-05-01
Payer: COMMERCIAL

## 2024-05-01 DIAGNOSIS — H52.4 PRESBYOPIA: ICD-10-CM

## 2024-05-01 DIAGNOSIS — H43.813 PVD (POSTERIOR VITREOUS DETACHMENT), BOTH EYES: ICD-10-CM

## 2024-05-01 DIAGNOSIS — I87.2 CHRONIC VENOUS INSUFFICIENCY: ICD-10-CM

## 2024-05-01 DIAGNOSIS — I83.93 ASYMPTOMATIC VARICOSE VEINS OF BILATERAL LOWER EXTREMITIES: ICD-10-CM

## 2024-05-01 DIAGNOSIS — Z83.518 FAMILY HISTORY OF MACULAR DEGENERATION: ICD-10-CM

## 2024-05-01 DIAGNOSIS — H04.123 DRY EYES, BILATERAL: Primary | ICD-10-CM

## 2024-05-01 DIAGNOSIS — Z96.1 PSEUDOPHAKIA: ICD-10-CM

## 2024-05-01 PROCEDURE — 92015 DETERMINE REFRACTIVE STATE: CPT | Performed by: OPHTHALMOLOGY

## 2024-05-01 PROCEDURE — 92004 COMPRE OPH EXAM NEW PT 1/>: CPT | Performed by: OPHTHALMOLOGY

## 2024-05-01 PROCEDURE — 93970 EXTREMITY STUDY: CPT

## 2024-05-01 PROCEDURE — 93970 EXTREMITY STUDY: CPT | Performed by: SURGERY

## 2024-05-01 ASSESSMENT — VISUAL ACUITY
OD_SC: 20/20
METHOD: SNELLEN - LINEAR
OS_SC: 20/20
OS_SC+: -2

## 2024-05-01 ASSESSMENT — ENCOUNTER SYMPTOMS
CONSTITUTIONAL NEGATIVE: 0
HEMATOLOGIC/LYMPHATIC NEGATIVE: 0
ENDOCRINE NEGATIVE: 0
EYES NEGATIVE: 1
PSYCHIATRIC NEGATIVE: 0
CARDIOVASCULAR NEGATIVE: 0
NEUROLOGICAL NEGATIVE: 0
ALLERGIC/IMMUNOLOGIC NEGATIVE: 0
MUSCULOSKELETAL NEGATIVE: 0
GASTROINTESTINAL NEGATIVE: 0
RESPIRATORY NEGATIVE: 0

## 2024-05-01 ASSESSMENT — REFRACTION_WEARINGRX
OD_SPHERE: OTC READERS
OS_SPHERE: OTC READERS

## 2024-05-01 ASSESSMENT — CONF VISUAL FIELD
OD_NORMAL: 1
OS_INFERIOR_NASAL_RESTRICTION: 0
OD_SUPERIOR_TEMPORAL_RESTRICTION: 0
OS_SUPERIOR_TEMPORAL_RESTRICTION: 0
OD_SUPERIOR_NASAL_RESTRICTION: 0
OD_INFERIOR_TEMPORAL_RESTRICTION: 0
OS_INFERIOR_TEMPORAL_RESTRICTION: 0
OD_INFERIOR_NASAL_RESTRICTION: 0
OS_SUPERIOR_NASAL_RESTRICTION: 0
OS_NORMAL: 1

## 2024-05-01 ASSESSMENT — TONOMETRY
OS_IOP_MMHG: 16
OD_IOP_MMHG: 14
IOP_METHOD: GOLDMANN APPLANATION

## 2024-05-01 ASSESSMENT — REFRACTION_MANIFEST
OS_CYLINDER: SPHERE
OD_SPHERE: PLANO
OS_SPHERE: -0.25
OS_ADD: +2.50
OD_ADD: +2.50
OD_CYLINDER: SPHERE

## 2024-05-01 ASSESSMENT — CUP TO DISC RATIO
OD_RATIO: 0.3, TILTED
OS_RATIO: 0.3, TILTED

## 2024-05-18 DIAGNOSIS — J30.2 OTHER SEASONAL ALLERGIC RHINITIS: ICD-10-CM

## 2024-05-21 RX ORDER — AZELASTINE 1 MG/ML
SPRAY, METERED NASAL
Qty: 30 ML | Refills: 3 | Status: SHIPPED | OUTPATIENT
Start: 2024-05-21 | End: 2024-06-20

## 2024-06-10 ENCOUNTER — TELEPHONE (OUTPATIENT)
Dept: CARDIOLOGY | Facility: CLINIC | Age: 59
End: 2024-06-10
Payer: COMMERCIAL

## 2024-06-10 NOTE — TELEPHONE ENCOUNTER
Vascular results reviewed with Dr. Madden and discussed with patient. Recommend PCP follow up for continued work up of edema. States understanding and agreement

## 2024-06-14 ENCOUNTER — APPOINTMENT (OUTPATIENT)
Dept: PRIMARY CARE | Facility: CLINIC | Age: 59
End: 2024-06-14
Payer: COMMERCIAL

## 2024-06-14 VITALS
DIASTOLIC BLOOD PRESSURE: 70 MMHG | WEIGHT: 164.4 LBS | SYSTOLIC BLOOD PRESSURE: 110 MMHG | BODY MASS INDEX: 27.36 KG/M2 | TEMPERATURE: 97.8 F

## 2024-06-14 DIAGNOSIS — G90.A POTS (POSTURAL ORTHOSTATIC TACHYCARDIA SYNDROME): ICD-10-CM

## 2024-06-14 DIAGNOSIS — E78.5 BORDERLINE HYPERLIPIDEMIA: ICD-10-CM

## 2024-06-14 DIAGNOSIS — R73.01 IMPAIRED FASTING BLOOD SUGAR: Primary | ICD-10-CM

## 2024-06-14 DIAGNOSIS — G90.3 MULTI-SYSTEM DEGENERATION OF THE AUTONOMIC NERVOUS SYSTEM (MULTI): ICD-10-CM

## 2024-06-14 PROBLEM — R06.02 SOB (SHORTNESS OF BREATH) ON EXERTION: Status: RESOLVED | Noted: 2023-06-29 | Resolved: 2024-06-14

## 2024-06-14 PROBLEM — G43.909 MIGRAINES: Status: RESOLVED | Noted: 2023-06-13 | Resolved: 2024-06-14

## 2024-06-14 PROCEDURE — 99214 OFFICE O/P EST MOD 30 MIN: CPT | Performed by: INTERNAL MEDICINE

## 2024-06-14 PROCEDURE — 1036F TOBACCO NON-USER: CPT | Performed by: INTERNAL MEDICINE

## 2024-06-14 RX ORDER — ATORVASTATIN CALCIUM 10 MG/1
10 TABLET, FILM COATED ORAL DAILY
Qty: 90 TABLET | Refills: 3 | Status: SHIPPED | OUTPATIENT
Start: 2024-06-14 | End: 2025-06-09

## 2024-06-14 RX ORDER — ATORVASTATIN CALCIUM 10 MG/1
10 TABLET, FILM COATED ORAL DAILY
Qty: 30 TABLET | Refills: 0 | Status: SHIPPED | OUTPATIENT
Start: 2024-06-14 | End: 2024-07-14

## 2024-06-14 ASSESSMENT — PATIENT HEALTH QUESTIONNAIRE - PHQ9
SUM OF ALL RESPONSES TO PHQ9 QUESTIONS 1 AND 2: 0
1. LITTLE INTEREST OR PLEASURE IN DOING THINGS: NOT AT ALL
2. FEELING DOWN, DEPRESSED OR HOPELESS: NOT AT ALL

## 2024-06-14 NOTE — PROGRESS NOTES
Subjective   Patient ID: Pia Flores is a 58 y.o. female who presents for Follow-up and Med Refill.    Med Refill       Overall well   #1 POTS-overall doing well. Continue treatment. f/u cards  #2 varicose veins-continue compression stockings.   #3 rotator cuff/biceps tendinitis-resolved.  #4 migraines- resolved  #5 rt posterior leg pain- c/s ortho. Dermal exam  #6 increased LDL- +fhx CAD, excellent CACS (=0 in 2018). modestly reviewed diet and exercise. Recommend beginning a statin. She would like to try 6 months of lifestyle. We will follow-up in 6 months.  #7 neck/dizziness- better after PT--> home exercises. repeat PT INB.   #8 cataracts- s/p bl repair.   #9 IFBS-     Review of Systems   All other systems reviewed and are negative.      Objective   /70   Temp 36.6 °C (97.8 °F)   Wt 74.6 kg (164 lb 6.4 oz)   BMI 27.36 kg/m²     Physical Exam  Constitutional:       Appearance: Normal appearance.   Cardiovascular:      Rate and Rhythm: Normal rate and regular rhythm.      Pulses: Normal pulses.      Heart sounds: Normal heart sounds. No murmur heard.  Pulmonary:      Effort: Pulmonary effort is normal. No respiratory distress.      Breath sounds: Normal breath sounds. No wheezing, rhonchi or rales.   Neurological:      Mental Status: She is alert.   Psychiatric:         Mood and Affect: Mood normal.         Behavior: Behavior normal.         Thought Content: Thought content normal.         Judgment: Judgment normal.       Lab Results   Component Value Date    WBC 5.3 03/07/2024    HGB 14.0 03/07/2024    HCT 41.7 03/07/2024     03/07/2024    CHOL 174 10/26/2023    TRIG 57 10/26/2023    HDL 61.8 10/26/2023    ALT 17 03/07/2024    AST 18 03/07/2024     03/07/2024    K 4.5 03/07/2024     03/07/2024    CREATININE 0.69 03/07/2024    BUN 16 03/07/2024    CO2 30 03/07/2024    TSH 2.04 08/08/2022    HGBA1C 5.7 (A) 06/14/2023       Assessment/Plan     #1 POTS-overall doing well. Continue  treatment. f/u cards  #2 varicose veins-continue compression stockings.   #3 rotator cuff/biceps tendinitis-resolved.  #4 migraines- resolved  #5 rt posterior leg pain- c/s ortho. Dermal exam  #6 increased LDL- +fhx CAD, excellent CACS (=0 in 2018). modestly reviewed diet and exercise. Recommend beginning a statin last visit.  We will retest and Tx pending  #7 neck/dizziness- better after PT--> home exercises. repeat PT INB.   #8 cataracts- s/p bl repair.   #9 IFBS- reviewed. Reviewed implications on coronary artery disease and risk of diabetes. Focus on a low sugar/low. Daily exercise. Nutrition consult. Follow-up      [place] UTD per pt   Shingrx vaccine, reviewed  Stay UTD on COVID vaccine, reviewed importance.

## 2024-07-08 DIAGNOSIS — E78.5 BORDERLINE HYPERLIPIDEMIA: ICD-10-CM

## 2024-07-09 RX ORDER — ATORVASTATIN CALCIUM 10 MG/1
10 TABLET, FILM COATED ORAL DAILY
Qty: 90 TABLET | Refills: 1 | Status: SHIPPED | OUTPATIENT
Start: 2024-07-09

## 2024-07-19 ENCOUNTER — LAB (OUTPATIENT)
Dept: LAB | Facility: LAB | Age: 59
End: 2024-07-19
Payer: COMMERCIAL

## 2024-07-19 DIAGNOSIS — R73.01 IMPAIRED FASTING BLOOD SUGAR: ICD-10-CM

## 2024-07-19 DIAGNOSIS — G90.A POTS (POSTURAL ORTHOSTATIC TACHYCARDIA SYNDROME): ICD-10-CM

## 2024-07-19 DIAGNOSIS — E78.5 BORDERLINE HYPERLIPIDEMIA: ICD-10-CM

## 2024-07-19 LAB
ALT SERPL W P-5'-P-CCNC: 18 U/L (ref 7–45)
ANION GAP SERPL CALC-SCNC: 12 MMOL/L (ref 10–20)
BUN SERPL-MCNC: 13 MG/DL (ref 6–23)
CALCIUM SERPL-MCNC: 9.2 MG/DL (ref 8.6–10.3)
CHLORIDE SERPL-SCNC: 105 MMOL/L (ref 98–107)
CHOLEST SERPL-MCNC: 164 MG/DL (ref 0–199)
CHOLESTEROL/HDL RATIO: 2.7
CO2 SERPL-SCNC: 26 MMOL/L (ref 21–32)
CREAT SERPL-MCNC: 0.6 MG/DL (ref 0.5–1.05)
EGFRCR SERPLBLD CKD-EPI 2021: >90 ML/MIN/1.73M*2
ERYTHROCYTE [DISTWIDTH] IN BLOOD BY AUTOMATED COUNT: 12.4 % (ref 11.5–14.5)
EST. AVERAGE GLUCOSE BLD GHB EST-MCNC: 120 MG/DL
GLUCOSE SERPL-MCNC: 89 MG/DL (ref 74–99)
HBA1C MFR BLD: 5.8 %
HCT VFR BLD AUTO: 40.6 % (ref 36–46)
HDLC SERPL-MCNC: 61.8 MG/DL
HGB BLD-MCNC: 13.3 G/DL (ref 12–16)
LDLC SERPL CALC-MCNC: 90 MG/DL
MCH RBC QN AUTO: 30.7 PG (ref 26–34)
MCHC RBC AUTO-ENTMCNC: 32.8 G/DL (ref 32–36)
MCV RBC AUTO: 94 FL (ref 80–100)
NON HDL CHOLESTEROL: 102 MG/DL (ref 0–149)
NRBC BLD-RTO: 0 /100 WBCS (ref 0–0)
PLATELET # BLD AUTO: 277 X10*3/UL (ref 150–450)
POTASSIUM SERPL-SCNC: 4.1 MMOL/L (ref 3.5–5.3)
RBC # BLD AUTO: 4.33 X10*6/UL (ref 4–5.2)
SODIUM SERPL-SCNC: 139 MMOL/L (ref 136–145)
TRIGL SERPL-MCNC: 63 MG/DL (ref 0–149)
TSH SERPL-ACNC: 1.9 MIU/L (ref 0.44–3.98)
VLDL: 13 MG/DL (ref 0–40)
WBC # BLD AUTO: 6 X10*3/UL (ref 4.4–11.3)

## 2024-07-19 PROCEDURE — 36415 COLL VENOUS BLD VENIPUNCTURE: CPT

## 2024-07-19 PROCEDURE — 80048 BASIC METABOLIC PNL TOTAL CA: CPT

## 2024-07-19 PROCEDURE — 84460 ALANINE AMINO (ALT) (SGPT): CPT

## 2024-07-19 PROCEDURE — 83036 HEMOGLOBIN GLYCOSYLATED A1C: CPT

## 2024-07-19 PROCEDURE — 85027 COMPLETE CBC AUTOMATED: CPT

## 2024-07-19 PROCEDURE — 80061 LIPID PANEL: CPT

## 2024-07-19 PROCEDURE — 84443 ASSAY THYROID STIM HORMONE: CPT

## 2024-08-19 DIAGNOSIS — G90.A POTS (POSTURAL ORTHOSTATIC TACHYCARDIA SYNDROME): ICD-10-CM

## 2024-08-19 RX ORDER — METOPROLOL SUCCINATE 25 MG/1
25 TABLET, EXTENDED RELEASE ORAL DAILY
Qty: 30 TABLET | Refills: 0 | Status: SHIPPED | OUTPATIENT
Start: 2024-08-19 | End: 2024-08-21 | Stop reason: SDUPTHER

## 2024-08-21 RX ORDER — METOPROLOL SUCCINATE 25 MG/1
25 TABLET, EXTENDED RELEASE ORAL DAILY
Qty: 90 TABLET | Refills: 1 | Status: SHIPPED | OUTPATIENT
Start: 2024-08-21 | End: 2025-02-17

## 2024-10-10 ENCOUNTER — APPOINTMENT (OUTPATIENT)
Dept: PODIATRY | Facility: CLINIC | Age: 59
End: 2024-10-10
Payer: COMMERCIAL

## 2024-10-10 DIAGNOSIS — M72.2 PLANTAR FASCIITIS: Primary | ICD-10-CM

## 2024-10-10 PROCEDURE — 99212 OFFICE O/P EST SF 10 MIN: CPT | Performed by: PODIATRIST

## 2024-10-10 PROCEDURE — 1036F TOBACCO NON-USER: CPT | Performed by: PODIATRIST

## 2024-10-10 NOTE — PROGRESS NOTES
CC: left heel pain.     HPI:  Patient seen follow up left plantar fasciitis, needs new orthotics, prp injection has helped, no new issues.    PCP: Dr. Devi  Last visit: 6-12-24     PMH  Past Medical History:   Diagnosis Date    Personal history of other diseases of the nervous system and sense organs     History of cataract    Personal history of other diseases of the respiratory system     History of acute pharyngitis    Personal history of other endocrine, nutritional and metabolic disease     History of high cholesterol    Postural orthostatic tachycardia syndrome (POTS) 02/21/2022    POTS (postural orthostatic tachycardia syndrome)     MEDS    Current Outpatient Medications:     anastrozole (Arimidex) 1 mg tablet, Take 1 tablet (1 mg total) by mouth once daily., Disp: , Rfl:     ascorbic acid (Vitamin C) 1,000 mg tablet, Take by mouth., Disp: , Rfl:     atorvastatin (Lipitor) 10 mg tablet, Take 1 tablet (10 mg) by mouth once daily., Disp: 90 tablet, Rfl: 3    atorvastatin (Lipitor) 10 mg tablet, TAKE 1 TABLET BY MOUTH EVERY DAY, Disp: 90 tablet, Rfl: 1    azelastine (Astelin) 137 mcg (0.1 %) nasal spray, USE 2 SPRAYS INTO EACH NOSTRIL TWICE A DAY, Disp: 30 mL, Rfl: 3    buPROPion XL (Wellbutrin XL) 150 mg 24 hr tablet, Take 1 tablet (150 mg) by mouth once daily., Disp: , Rfl:     cholecalciferol (Vitamin D-3) 50 MCG (2000 UT) tablet, Take 1 tablet (2,000 Units) by mouth once daily., Disp: , Rfl:     clobetasol (Temovate) 0.05 % cream, APPLY TWICE DAILY FOR 2WKS, Disp: , Rfl:     coenzyme Q-10 200 mg capsule, TAKE AS DIRECTED., Disp: , Rfl:     desonide (DesOwen) 0.05 % cream, APPLY TO FACE TWICE A DAY X 2 WEEKS., Disp: , Rfl:     magnesium oxide-Mg AA chelate (Magnesium, oxide/AA chelate,) 300 mg capsule, once every 24 hours., Disp: , Rfl:     metoprolol succinate XL (Toprol-XL) 25 mg 24 hr tablet, Take 1 tablet (25 mg) by mouth once daily for 180 doses., Disp: 90 tablet, Rfl: 1    pseudoephedrine ER (Sudafed-12  Hour) 120 mg 12 hr tablet, Take by mouth., Disp: , Rfl:   Allergies  Allergies   Allergen Reactions    Propoxyphene Anaphylaxis, Hives and Swelling     Throat swelling    Propoxyphene N-Acetaminophen Hives, Other and Anaphylaxis     Social History     Socioeconomic History    Marital status:    Tobacco Use    Smoking status: Never    Smokeless tobacco: Never   Substance and Sexual Activity    Alcohol use: Not Currently    Drug use: Never     Family History   Problem Relation Name Age of Onset    Hypertension Mother      Breast cancer Mother      Coronary artery disease Father  60        -tob    Hyperlipidemia Father      Hypertension Father      Seizures Sister      Thyroid disease Sister      Alzheimer's disease Mother's Sister      Alzheimer's disease Mother's Brother      Alzheimer's disease Father's Sister      Breast cancer Father's Sister      Alzheimer's disease Father's Brother      Breast cancer Maternal Grandmother       Past Surgical History:   Procedure Laterality Date    CATARACT EXTRACTION Bilateral      SECTION, CLASSIC  2014     Section    OTHER SURGICAL HISTORY  2022    Tonsillectomy    OTHER SURGICAL HISTORY  2022    Myringotomy with tube placement       REVIEW OF SYSTEMS    Musculoskeletal: + as noted in HPI.       Physical examination:   On General Observation: Patient is a pleasant, cooperative, well developed 59 y.o.  adult female. The patient is alert and oriented to time, place and person. Patient has normal affect and mood.  There were no vitals taken for this visit.    Vascular:  DP and PT pulses are 2/4 b/l.  CFT 5 sec b/l le    Muscular: Strength is 5/5 for all instrinsic and extrinsic muscle groups with exception of ankle dorsiflexion and plantarflexion d/t guarding.  Minimal pain with palpation to the plantar medial aspect of the left heel at the insertion of the plantar medial calcaneal tubercle.  No signs of calcaneal stress fracture.       Neuro:   Light touch present bilateral.     Derm:   Skin texture and turgor within normal limits.     No rashes, subcutaneous nodules, or open lesions noted.  No hyperkeratotic tissue. No erythema    Ortho:  Very slight pain left heel with palpation, rom is stable of the AJ    ASSESSMENT:    Plantar fasciitis left       PLAN:   Exam  A comprehensive history and physical examination were preformed. The patient was educated on clinical findings, diagnosis and treatment plans. Patient understands all that has been explained and all questions were answered to apparent satisfaction.     - We discussed the etiology of the heel complaints as well as the plantar fasciitis treatment protocol.  -Will continue the hep and price  -Will check benefits for new orthotics.      Catrachito Palma DPM

## 2024-10-11 ENCOUNTER — TELEPHONE (OUTPATIENT)
Dept: PRIMARY CARE | Facility: CLINIC | Age: 59
End: 2024-10-11
Payer: COMMERCIAL

## 2024-10-11 NOTE — TELEPHONE ENCOUNTER
[facility] [place]  [phone]  PER: ANDRES COUGHLIN  CPT  X2  DX M72.2 ARE VALID AND BILLABLE, BASED ON MEDICAL NECESSITY.  PLAN ALLOWS 1 PAIR EVERY 547 DAYS.  PLAN PAYS AT 90% OF THE ALLOWED AMT, AFTER DEDUCTIBLE OF $300 HAS BEEN MET. ALL OF WHICH HAS BEEN SATISFIED. PATIENT HAS A $900 CO INSURANCE, ALL OF WHICH HAS BEEN SATISFIED. $9100 MAX OUT OF POCKET, OF WHICH $1200 HAS BEEN ACCUMULATED.  RUBY IS IN NETWORK  NO PRIOR AUTH NEEDED.  REFERENCE #[id number]    [facility] [place] W THE ABOVE INFO. ASKED PATIENT TO GIVE US A CALL TO SCHEDULE. THANK YOU!

## 2024-10-11 NOTE — TELEPHONE ENCOUNTER
----- Message from Catrachito Palma sent at 10/10/2024 10:31 AM EDT -----  Regarding: orthotics  Please check orthotic benefits dx is m72.2 thanks

## 2024-11-15 ENCOUNTER — APPOINTMENT (OUTPATIENT)
Dept: PODIATRY | Facility: CLINIC | Age: 59
End: 2024-11-15
Payer: COMMERCIAL

## 2024-11-15 DIAGNOSIS — M72.2 PLANTAR FASCIITIS: Primary | ICD-10-CM

## 2024-11-15 PROCEDURE — L3020 FOOT LONGITUD/METATARSAL SUP: HCPCS | Performed by: PODIATRIST

## 2024-11-18 NOTE — PROGRESS NOTES
CC: left heel pain.      HPI:  Patient seen to be casted for orthotics, doing well overall.    PCP: Dr. Devi  Last visit: 6-12-24      PMH  Medical History        Past Medical History:   Diagnosis Date    Personal history of other diseases of the nervous system and sense organs       History of cataract    Personal history of other diseases of the respiratory system       History of acute pharyngitis    Personal history of other endocrine, nutritional and metabolic disease       History of high cholesterol    Postural orthostatic tachycardia syndrome (POTS) 02/21/2022     POTS (postural orthostatic tachycardia syndrome)         MEDS    Current Medications      Current Outpatient Medications:     anastrozole (Arimidex) 1 mg tablet, Take 1 tablet (1 mg total) by mouth once daily., Disp: , Rfl:     ascorbic acid (Vitamin C) 1,000 mg tablet, Take by mouth., Disp: , Rfl:     atorvastatin (Lipitor) 10 mg tablet, Take 1 tablet (10 mg) by mouth once daily., Disp: 90 tablet, Rfl: 3    atorvastatin (Lipitor) 10 mg tablet, TAKE 1 TABLET BY MOUTH EVERY DAY, Disp: 90 tablet, Rfl: 1    azelastine (Astelin) 137 mcg (0.1 %) nasal spray, USE 2 SPRAYS INTO EACH NOSTRIL TWICE A DAY, Disp: 30 mL, Rfl: 3    buPROPion XL (Wellbutrin XL) 150 mg 24 hr tablet, Take 1 tablet (150 mg) by mouth once daily., Disp: , Rfl:     cholecalciferol (Vitamin D-3) 50 MCG (2000 UT) tablet, Take 1 tablet (2,000 Units) by mouth once daily., Disp: , Rfl:     clobetasol (Temovate) 0.05 % cream, APPLY TWICE DAILY FOR 2WKS, Disp: , Rfl:     coenzyme Q-10 200 mg capsule, TAKE AS DIRECTED., Disp: , Rfl:     desonide (DesOwen) 0.05 % cream, APPLY TO FACE TWICE A DAY X 2 WEEKS., Disp: , Rfl:     magnesium oxide-Mg AA chelate (Magnesium, oxide/AA chelate,) 300 mg capsule, once every 24 hours., Disp: , Rfl:     metoprolol succinate XL (Toprol-XL) 25 mg 24 hr tablet, Take 1 tablet (25 mg) by mouth once daily for 180 doses., Disp: 90 tablet, Rfl: 1    pseudoephedrine  ER (Sudafed-12 Hour) 120 mg 12 hr tablet, Take by mouth., Disp: , Rfl:      Allergies  Allergies         Allergies   Allergen Reactions    Propoxyphene Anaphylaxis, Hives and Swelling       Throat swelling    Propoxyphene N-Acetaminophen Hives, Other and Anaphylaxis         Social History   Social History           Socioeconomic History    Marital status:    Tobacco Use    Smoking status: Never    Smokeless tobacco: Never   Substance and Sexual Activity    Alcohol use: Not Currently    Drug use: Never         Family History          Family History   Problem Relation Name Age of Onset    Hypertension Mother        Breast cancer Mother        Coronary artery disease Father   60         -tob    Hyperlipidemia Father        Hypertension Father        Seizures Sister        Thyroid disease Sister        Alzheimer's disease Mother's Sister        Alzheimer's disease Mother's Brother        Alzheimer's disease Father's Sister        Breast cancer Father's Sister        Alzheimer's disease Father's Brother        Breast cancer Maternal Grandmother             Surgical History         Past Surgical History:   Procedure Laterality Date    CATARACT EXTRACTION Bilateral      SECTION, CLASSIC   2014      Section    OTHER SURGICAL HISTORY   2022     Tonsillectomy    OTHER SURGICAL HISTORY   2022     Myringotomy with tube placement            REVIEW OF SYSTEMS     Musculoskeletal: + as noted in HPI.         Physical examination:   On General Observation: Patient is a pleasant, cooperative, well developed 59 y.o.  adult female. The patient is alert and oriented to time, place and person. Patient has normal affect and mood.  There were no vitals taken for this visit.     Vascular:  DP and PT pulses are 2/4 b/l.  CFT 5 sec b/l le     Muscular: Strength is 5/5 for all instrinsic and extrinsic muscle groups with exception of ankle dorsiflexion and plantarflexion d/t guarding.  Minimal pain  with palpation to the plantar medial aspect of the left heel at the insertion of the plantar medial calcaneal tubercle.  No signs of calcaneal stress fracture.       Neuro:   Light touch present bilateral.      Derm:   Skin texture and turgor within normal limits.     No rashes, subcutaneous nodules, or open lesions noted.  No hyperkeratotic tissue. No erythema     Ortho:  Very slight pain left heel with palpation, rom is stable of the AJ     ASSESSMENT:    Plantar fasciitis left         PLAN:   Casted for custom orthotics biofoam, call when arrives.        Catrachito Palma DPM

## 2024-12-10 DIAGNOSIS — J30.2 OTHER SEASONAL ALLERGIC RHINITIS: ICD-10-CM

## 2024-12-10 RX ORDER — AZELASTINE 1 MG/ML
SPRAY, METERED NASAL
Qty: 30 ML | Refills: 3 | Status: SHIPPED | OUTPATIENT
Start: 2024-12-10 | End: 2025-01-09

## 2024-12-16 ENCOUNTER — APPOINTMENT (OUTPATIENT)
Dept: PRIMARY CARE | Facility: CLINIC | Age: 59
End: 2024-12-16
Payer: COMMERCIAL

## 2025-01-07 ENCOUNTER — TELEPHONE (OUTPATIENT)
Dept: PRIMARY CARE | Facility: CLINIC | Age: 60
End: 2025-01-07
Payer: COMMERCIAL

## 2025-01-07 NOTE — TELEPHONE ENCOUNTER
Your orthotics are in.  Please schedule an appointment at your earliest convenience with Dr Palma to get your new inserts.  You can call [phone] or schedule through [employer].   Thank you.

## 2025-01-10 ENCOUNTER — OFFICE VISIT (OUTPATIENT)
Dept: PODIATRY | Facility: CLINIC | Age: 60
End: 2025-01-10
Payer: COMMERCIAL

## 2025-01-10 ENCOUNTER — APPOINTMENT (OUTPATIENT)
Dept: PRIMARY CARE | Facility: CLINIC | Age: 60
End: 2025-01-10
Payer: COMMERCIAL

## 2025-01-10 VITALS
SYSTOLIC BLOOD PRESSURE: 118 MMHG | HEIGHT: 65 IN | BODY MASS INDEX: 27.39 KG/M2 | WEIGHT: 164.4 LBS | DIASTOLIC BLOOD PRESSURE: 80 MMHG

## 2025-01-10 DIAGNOSIS — E78.5 BORDERLINE HYPERLIPIDEMIA: ICD-10-CM

## 2025-01-10 DIAGNOSIS — M72.2 PLANTAR FASCIITIS: Primary | ICD-10-CM

## 2025-01-10 DIAGNOSIS — D05.10 DUCTAL CARCINOMA IN SITU (DCIS) OF BREAST, UNSPECIFIED LATERALITY: ICD-10-CM

## 2025-01-10 DIAGNOSIS — G90.A POTS (POSTURAL ORTHOSTATIC TACHYCARDIA SYNDROME): ICD-10-CM

## 2025-01-10 DIAGNOSIS — R73.01 IMPAIRED FASTING BLOOD SUGAR: Primary | ICD-10-CM

## 2025-01-10 DIAGNOSIS — R60.9 EDEMA, UNSPECIFIED TYPE: ICD-10-CM

## 2025-01-10 PROBLEM — S06.0X9A CONCUSSION WITH LOSS OF CONSCIOUSNESS: Status: RESOLVED | Noted: 2023-06-29 | Resolved: 2025-01-10

## 2025-01-10 PROBLEM — R41.9 COGNITIVE COMPLAINTS: Status: RESOLVED | Noted: 2023-06-29 | Resolved: 2025-01-10

## 2025-01-10 PROBLEM — R42 DIZZINESS: Status: RESOLVED | Noted: 2023-06-29 | Resolved: 2025-01-10

## 2025-01-10 LAB — POC HEMOGLOBIN A1C: 5.6 % (ref 4.2–6.5)

## 2025-01-10 PROCEDURE — 83036 HEMOGLOBIN GLYCOSYLATED A1C: CPT | Performed by: INTERNAL MEDICINE

## 2025-01-10 PROCEDURE — 1036F TOBACCO NON-USER: CPT | Performed by: PODIATRIST

## 2025-01-10 PROCEDURE — 3008F BODY MASS INDEX DOCD: CPT | Performed by: INTERNAL MEDICINE

## 2025-01-10 PROCEDURE — 99212 OFFICE O/P EST SF 10 MIN: CPT | Performed by: PODIATRIST

## 2025-01-10 PROCEDURE — 99214 OFFICE O/P EST MOD 30 MIN: CPT | Performed by: INTERNAL MEDICINE

## 2025-01-10 PROCEDURE — 1036F TOBACCO NON-USER: CPT | Performed by: INTERNAL MEDICINE

## 2025-01-10 RX ORDER — LETROZOLE 2.5 MG/1
2.5 TABLET, FILM COATED ORAL
COMMUNITY
Start: 2025-01-06

## 2025-01-10 NOTE — PROGRESS NOTES
"Subjective   Patient ID: Pia Flores is a 59 y.o. female who presents for f/u      Med Refill     Overall well   #1 POTS-overall doing well. Continue treatment. f/u cards  #2 varicose veins-continue compression stockings.   #3 rotator cuff/biceps tendinitis-resolved.  #4 migraines- resolved  #5 rt posterior leg pain- c/s ortho. Dermal exam  #6 increased LDL- +fhx CAD, excellent CACS (=0 in 2018). modestly reviewed diet and exercise. Recommend beginning a statin. She would like to try 6 months of lifestyle. We will follow-up in 6 months.  #7 neck/dizziness- better after PT--> home exercises. repeat PT INB.   #8 cataracts- s/p bl repair.   #9 IFBS- working on less sugar.  \"Fair\"    Review of Systems   All other systems reviewed and are negative.      Objective   There were no vitals taken for this visit.    Physical Exam  Constitutional:       Appearance: Normal appearance.   Cardiovascular:      Rate and Rhythm: Normal rate and regular rhythm.      Pulses: Normal pulses.      Heart sounds: Normal heart sounds. No murmur heard.  Pulmonary:      Effort: Pulmonary effort is normal. No respiratory distress.      Breath sounds: Normal breath sounds. No wheezing, rhonchi or rales.   Neurological:      Mental Status: She is alert.   Psychiatric:         Mood and Affect: Mood normal.         Behavior: Behavior normal.         Thought Content: Thought content normal.         Judgment: Judgment normal.     Lab Results   Component Value Date    WBC 6.0 07/19/2024    HGB 13.3 07/19/2024    HCT 40.6 07/19/2024     07/19/2024    CHOL 164 07/19/2024    TRIG 63 07/19/2024    HDL 61.8 07/19/2024    ALT 18 07/19/2024    AST 18 03/07/2024     07/19/2024    K 4.1 07/19/2024     07/19/2024    CREATININE 0.60 07/19/2024    BUN 13 07/19/2024    CO2 26 07/19/2024    TSH 1.90 07/19/2024    HGBA1C 5.8 (H) 07/19/2024       Assessment/Plan     #1 POTS-overall doing well. Continue treatment. f/u cards  #2 varicose " veins-continue compression stockings.   #3 rotator cuff/biceps tendinitis-resolved.  #4 migraines- resolved  #5 rt posterior leg pain- c/s ortho. Dermal exam  #6 increased LDL- +fhx CAD, excellent CACS (=0 in 2018). modestly reviewed diet and exercise. Recommend beginning a statin last visit.  We will retest and Tx pending  #7 neck/dizziness- better after PT--> home exercises. repeat PT INB.   #8 cataracts- s/p bl repair.   #9 IFBS- better. reviewed. Reviewed implications on coronary artery disease and risk of diabetes. Focus on a low sugar/low. Daily exercise. Nutrition consult. 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   f/u  6 mths

## 2025-01-12 NOTE — PROGRESS NOTES
CC: left heel pain.      HPI:  Patient seen to  orthotics.     PCP: Dr. Devi  Last visit: 1-10-25     PMH  Medical History           Past Medical History:   Diagnosis Date    Personal history of other diseases of the nervous system and sense organs       History of cataract    Personal history of other diseases of the respiratory system       History of acute pharyngitis    Personal history of other endocrine, nutritional and metabolic disease       History of high cholesterol    Postural orthostatic tachycardia syndrome (POTS) 02/21/2022     POTS (postural orthostatic tachycardia syndrome)         MEDS     Current Medications      Current Outpatient Medications:     anastrozole (Arimidex) 1 mg tablet, Take 1 tablet (1 mg total) by mouth once daily., Disp: , Rfl:     ascorbic acid (Vitamin C) 1,000 mg tablet, Take by mouth., Disp: , Rfl:     atorvastatin (Lipitor) 10 mg tablet, Take 1 tablet (10 mg) by mouth once daily., Disp: 90 tablet, Rfl: 3    atorvastatin (Lipitor) 10 mg tablet, TAKE 1 TABLET BY MOUTH EVERY DAY, Disp: 90 tablet, Rfl: 1    azelastine (Astelin) 137 mcg (0.1 %) nasal spray, USE 2 SPRAYS INTO EACH NOSTRIL TWICE A DAY, Disp: 30 mL, Rfl: 3    buPROPion XL (Wellbutrin XL) 150 mg 24 hr tablet, Take 1 tablet (150 mg) by mouth once daily., Disp: , Rfl:     cholecalciferol (Vitamin D-3) 50 MCG (2000 UT) tablet, Take 1 tablet (2,000 Units) by mouth once daily., Disp: , Rfl:     clobetasol (Temovate) 0.05 % cream, APPLY TWICE DAILY FOR 2WKS, Disp: , Rfl:     coenzyme Q-10 200 mg capsule, TAKE AS DIRECTED., Disp: , Rfl:     desonide (DesOwen) 0.05 % cream, APPLY TO FACE TWICE A DAY X 2 WEEKS., Disp: , Rfl:     magnesium oxide-Mg AA chelate (Magnesium, oxide/AA chelate,) 300 mg capsule, once every 24 hours., Disp: , Rfl:     metoprolol succinate XL (Toprol-XL) 25 mg 24 hr tablet, Take 1 tablet (25 mg) by mouth once daily for 180 doses., Disp: 90 tablet, Rfl: 1    pseudoephedrine ER (Sudafed-12 Hour)  120 mg 12 hr tablet, Take by mouth., Disp: , Rfl:       Allergies  Allergies             Allergies   Allergen Reactions    Propoxyphene Anaphylaxis, Hives and Swelling       Throat swelling    Propoxyphene N-Acetaminophen Hives, Other and Anaphylaxis         Social History   Social History              Socioeconomic History    Marital status:    Tobacco Use    Smoking status: Never    Smokeless tobacco: Never   Substance and Sexual Activity    Alcohol use: Not Currently    Drug use: Never         Family History               Family History   Problem Relation Name Age of Onset    Hypertension Mother        Breast cancer Mother        Coronary artery disease Father   60         -tob    Hyperlipidemia Father        Hypertension Father        Seizures Sister        Thyroid disease Sister        Alzheimer's disease Mother's Sister        Alzheimer's disease Mother's Brother        Alzheimer's disease Father's Sister        Breast cancer Father's Sister        Alzheimer's disease Father's Brother        Breast cancer Maternal Grandmother             Surgical History             Past Surgical History:   Procedure Laterality Date    CATARACT EXTRACTION Bilateral 2017     SECTION, CLASSIC   2014      Section    OTHER SURGICAL HISTORY   2022     Tonsillectomy    OTHER SURGICAL HISTORY   2022     Myringotomy with tube placement            REVIEW OF SYSTEMS     Musculoskeletal: + as noted in HPI.         Physical examination:   On General Observation: Patient is a pleasant, cooperative, well developed 59 y.o.  adult female. The patient is alert and oriented to time, place and person. Patient has normal affect and mood.  There were no vitals taken for this visit.     Vascular:  DP and PT pulses are 2/4 b/l.  CFT 5 sec b/l le     Muscular: Strength is 5/5 for all instrinsic and extrinsic muscle groups with exception of ankle dorsiflexion and plantarflexion d/t guarding.  Minimal pain with  palpation to the plantar medial aspect of the left heel at the insertion of the plantar medial calcaneal tubercle.  No signs of calcaneal stress fracture.       Neuro:   Light touch present bilateral.      Derm:   Skin texture and turgor within normal limits.     No rashes, subcutaneous nodules, or open lesions noted.  No hyperkeratotic tissue. No erythema     Ortho:  Very slight pain left heel with palpation, rom is stable of the AJ     ASSESSMENT:    Plantar fasciitis left         PLAN:   Exam  Dispensed orthotics with oral and written instructions, follow up 4 weeks if any issues.        Catrachito Palma DPM

## 2025-02-02 DIAGNOSIS — G90.A POTS (POSTURAL ORTHOSTATIC TACHYCARDIA SYNDROME): ICD-10-CM

## 2025-02-03 RX ORDER — METOPROLOL SUCCINATE 25 MG/1
25 TABLET, EXTENDED RELEASE ORAL DAILY
Qty: 90 TABLET | Refills: 0 | Status: SHIPPED | OUTPATIENT
Start: 2025-02-03

## 2025-02-17 ENCOUNTER — OFFICE VISIT (OUTPATIENT)
Dept: CARDIOLOGY | Facility: CLINIC | Age: 60
End: 2025-02-17
Payer: COMMERCIAL

## 2025-02-17 ENCOUNTER — APPOINTMENT (OUTPATIENT)
Dept: CARDIOLOGY | Facility: CLINIC | Age: 60
End: 2025-02-17
Payer: COMMERCIAL

## 2025-02-17 VITALS
WEIGHT: 168 LBS | DIASTOLIC BLOOD PRESSURE: 67 MMHG | HEART RATE: 98 BPM | TEMPERATURE: 97.2 F | SYSTOLIC BLOOD PRESSURE: 107 MMHG | HEIGHT: 65 IN | BODY MASS INDEX: 27.99 KG/M2

## 2025-02-17 DIAGNOSIS — E78.5 HYPERLIPIDEMIA, UNSPECIFIED HYPERLIPIDEMIA TYPE: ICD-10-CM

## 2025-02-17 DIAGNOSIS — G90.A POTS (POSTURAL ORTHOSTATIC TACHYCARDIA SYNDROME): Primary | ICD-10-CM

## 2025-02-17 PROCEDURE — 99214 OFFICE O/P EST MOD 30 MIN: CPT | Performed by: NURSE PRACTITIONER

## 2025-02-17 PROCEDURE — 3008F BODY MASS INDEX DOCD: CPT | Performed by: NURSE PRACTITIONER

## 2025-02-17 PROCEDURE — 1036F TOBACCO NON-USER: CPT | Performed by: NURSE PRACTITIONER

## 2025-02-17 RX ORDER — METOPROLOL SUCCINATE 25 MG/1
25 TABLET, EXTENDED RELEASE ORAL DAILY
Qty: 90 TABLET | Refills: 3 | Status: SHIPPED | OUTPATIENT
Start: 2025-02-17

## 2025-02-17 NOTE — PROGRESS NOTES
"Chief Complaint:   POTS     History Of Present Illness:    Pia Flores is a 59 y.o. female here with POTS. Tolerating metoprolol. Will note higher heart rates, per smart watch. Wears compression socks daily.     Every am will have 32 ounces of fluid in am.     Will feel palpitations when she drinks liquid IV.     Has not been syncopal.     Was last seen 9/27/2023:  Stopped atenolol in June when she started on lipitor.   Since then rates have been elevated. Diaphoretic. Wearing compression socks. Heart rates per smart watch are ranging 100 with sitting. When get up walk around 120-130s. When exercising felt ok, elliptical. Has not been syncopal. On avg drinks 36-48ounces. Does add a lot of salt to diet.       Allergies:  Propoxyphene and Propoxyphene n-acetaminophen    Review of Systems  All pertinent systems have been reviewed and are negative except for what is stated in the history of present illness.    All other systems have been reviewed and are negative and noncontributory to this patient's current ailments.     Visit Vitals  /67 (BP Location: Right arm)   Pulse 98   Temp 36.2 °C (97.2 °F)   Ht 1.651 m (5' 5\")   Wt 76.2 kg (168 lb)   BMI 27.96 kg/m²   Smoking Status Never   BSA 1.87 m²             Last Labs:  CBC -  Lab Results   Component Value Date    WBC 6.0 07/19/2024    HGB 13.3 07/19/2024    HCT 40.6 07/19/2024    MCV 94 07/19/2024     07/19/2024       CMP -  Lab Results   Component Value Date    CALCIUM 9.2 07/19/2024    PROT 7.1 03/07/2024    ALBUMIN 4.6 03/07/2024    AST 18 03/07/2024    ALT 18 07/19/2024    ALKPHOS 90 03/07/2024    BILITOT 0.3 03/07/2024       LIPID PANEL -   Lab Results   Component Value Date    CHOL 164 07/19/2024    TRIG 63 07/19/2024    HDL 61.8 07/19/2024    CHHDL 2.7 07/19/2024    LDLF 144 (H) 06/14/2023    VLDL 13 07/19/2024    NHDL 102 07/19/2024       RENAL FUNCTION PANEL -   Lab Results   Component Value Date    GLUCOSE 89 07/19/2024     07/19/2024 "    K 4.1 07/19/2024     07/19/2024    CO2 26 07/19/2024    ANIONGAP 12 07/19/2024    BUN 13 07/19/2024    CREATININE 0.60 07/19/2024    CALCIUM 9.2 07/19/2024    ALBUMIN 4.6 03/07/2024        Lab Results   Component Value Date    HGBA1C 5.6 01/10/2025         Objective   Vitals reviewed.   Constitutional:       Appearance: Healthy appearance. Not in distress.   Neck:      Vascular: No JVR. JVD normal.   Pulmonary:      Effort: Pulmonary effort is normal.      Breath sounds: Normal breath sounds. No wheezing. No rhonchi. No rales.   Chest:      Chest wall: Not tender to palpatation.   Cardiovascular:      PMI at left midclavicular line. Normal rate. Regular rhythm. Normal S1. Normal S2.       Murmurs: There is no murmur.      No gallop.  No click. No rub.   Pulses:     Intact distal pulses.   Edema:     Peripheral edema absent.   Abdominal:      General: Bowel sounds are normal.      Palpations: Abdomen is soft.      Tenderness: There is no abdominal tenderness.   Musculoskeletal: Normal range of motion.         General: No tenderness. Skin:     General: Skin is warm and dry.   Neurological:      General: No focal deficit present.      Mental Status: Alert and oriented to person, place and time.       Assessment/Plan   Diagnoses and all orders for this visit:  POTS (postural orthostatic tachycardia syndrome)  - symptoms remain controlled on metoprolol  - compression socks  - continue hydration and and electrolyte drinks, encouraged one in am   Borderline hyperlipidemia  - well controlled    Follow up 1 year   Outpatient Medications:  Current Outpatient Medications   Medication Instructions    ascorbic acid (Vitamin C) 1,000 mg tablet Take by mouth.    atorvastatin (LIPITOR) 10 mg, oral, Daily    azelastine (Astelin) 137 mcg (0.1 %) nasal spray USE 2 SPRAYS INTO EACH NOSTRIL TWICE A DAY    cholecalciferol (Vitamin D-3) 50 MCG (2000 UT) tablet 1 tablet, Daily    coenzyme Q-10 200 mg capsule TAKE AS DIRECTED.     letrozole (FEMARA) 2.5 mg, Daily RT    magnesium oxide-Mg AA chelate (Magnesium, oxide/AA chelate,) 300 mg capsule Every 24 hours    metoprolol succinate XL (TOPROL-XL) 25 mg, oral, Daily    pseudoephedrine ER (Sudafed-12 Hour) 120 mg 12 hr tablet Take by mouth.       Exclusive of any other services or procedures performed, I, Kandi GOODEN, spent 25 minutes in duration for this visit today.  This time consisted of chart review, obtaining history, and/or performing the exam as documented above, as well as, documenting the clinical information for the encounter in the electronic record, discussing treatment options, plans, and/or goals with patient, family, and/or caregiver, refilling medications, updating the electronic record, ordering medicines, lab work, imaging, referrals, and/or procedures as documented above and communicating with other [facility] professionals. I have discussed the results of laboratory, radiology, and cardiology studies with the patient and their family/caregiver.       I reviewed PCP note, oncology note, labs

## 2025-02-17 NOTE — PATIENT INSTRUCTIONS
LMNT  [facility]ing[facility] [place] [facility] [email or web address]

## 2025-03-28 LAB
ALT SERPL-CCNC: 16 U/L (ref 6–29)
ANION GAP SERPL CALCULATED.4IONS-SCNC: 9 MMOL/L (CALC) (ref 7–17)
BUN SERPL-MCNC: 12 MG/DL (ref 7–25)
BUN/CREAT SERPL: ABNORMAL (CALC) (ref 6–22)
CALCIUM SERPL-MCNC: 9 MG/DL (ref 8.6–10.4)
CHLORIDE SERPL-SCNC: 99 MMOL/L (ref 98–110)
CHOLEST SERPL-MCNC: 188 MG/DL
CHOLEST/HDLC SERPL: 3 (CALC)
CO2 SERPL-SCNC: 26 MMOL/L (ref 20–32)
CREAT SERPL-MCNC: 0.73 MG/DL (ref 0.5–1.03)
EGFRCR SERPLBLD CKD-EPI 2021: 95 ML/MIN/1.73M2
ERYTHROCYTE [DISTWIDTH] IN BLOOD BY AUTOMATED COUNT: 11.9 % (ref 11–15)
EST. AVERAGE GLUCOSE BLD GHB EST-MCNC: 120 MG/DL
EST. AVERAGE GLUCOSE BLD GHB EST-SCNC: 6.6 MMOL/L
GLUCOSE SERPL-MCNC: 78 MG/DL (ref 65–99)
HBA1C MFR BLD: 5.8 % OF TOTAL HGB
HCT VFR BLD AUTO: 40.7 % (ref 35–45)
HDLC SERPL-MCNC: 63 MG/DL
HGB BLD-MCNC: 13.4 G/DL (ref 11.7–15.5)
LDLC SERPL CALC-MCNC: 111 MG/DL (CALC)
MCH RBC QN AUTO: 30.8 PG (ref 27–33)
MCHC RBC AUTO-ENTMCNC: 32.9 G/DL (ref 32–36)
MCV RBC AUTO: 93.6 FL (ref 80–100)
NONHDLC SERPL-MCNC: 125 MG/DL (CALC)
PLATELET # BLD AUTO: 296 THOUSAND/UL (ref 140–400)
PMV BLD REES-ECKER: 9.6 FL (ref 7.5–12.5)
POTASSIUM SERPL-SCNC: 4.6 MMOL/L (ref 3.5–5.3)
RBC # BLD AUTO: 4.35 MILLION/UL (ref 3.8–5.1)
SODIUM SERPL-SCNC: 134 MMOL/L (ref 135–146)
TRIGL SERPL-MCNC: 55 MG/DL
WBC # BLD AUTO: 7.6 THOUSAND/UL (ref 3.8–10.8)

## 2025-04-21 DIAGNOSIS — E78.5 BORDERLINE HYPERLIPIDEMIA: ICD-10-CM

## 2025-04-22 RX ORDER — ATORVASTATIN CALCIUM 10 MG/1
10 TABLET, FILM COATED ORAL DAILY
Qty: 90 TABLET | Refills: 3 | Status: SHIPPED | OUTPATIENT
Start: 2025-04-22

## 2025-06-04 ENCOUNTER — APPOINTMENT (OUTPATIENT)
Dept: OPHTHALMOLOGY | Facility: CLINIC | Age: 60
End: 2025-06-04
Payer: COMMERCIAL

## 2025-06-22 DIAGNOSIS — J30.2 OTHER SEASONAL ALLERGIC RHINITIS: ICD-10-CM

## 2025-06-23 RX ORDER — AZELASTINE 1 MG/ML
2 SPRAY, METERED NASAL 2 TIMES DAILY
Qty: 3 ML | Refills: 3 | Status: SHIPPED | OUTPATIENT
Start: 2025-06-23 | End: 2025-07-23

## 2025-07-16 ENCOUNTER — APPOINTMENT (OUTPATIENT)
Dept: PRIMARY CARE | Facility: CLINIC | Age: 60
End: 2025-07-16
Payer: COMMERCIAL

## 2025-07-16 VITALS
WEIGHT: 162.2 LBS | DIASTOLIC BLOOD PRESSURE: 62 MMHG | SYSTOLIC BLOOD PRESSURE: 108 MMHG | BODY MASS INDEX: 26.99 KG/M2 | TEMPERATURE: 97.6 F

## 2025-07-16 DIAGNOSIS — R73.01 IMPAIRED FASTING BLOOD SUGAR: Primary | ICD-10-CM

## 2025-07-16 DIAGNOSIS — D05.10 DUCTAL CARCINOMA IN SITU (DCIS) OF BREAST, UNSPECIFIED LATERALITY: ICD-10-CM

## 2025-07-16 DIAGNOSIS — G90.A POTS (POSTURAL ORTHOSTATIC TACHYCARDIA SYNDROME): ICD-10-CM

## 2025-07-16 DIAGNOSIS — D53.9 NUTRITIONAL ANEMIA: ICD-10-CM

## 2025-07-16 DIAGNOSIS — E78.5 BORDERLINE HYPERLIPIDEMIA: ICD-10-CM

## 2025-07-16 PROCEDURE — 99214 OFFICE O/P EST MOD 30 MIN: CPT | Performed by: INTERNAL MEDICINE

## 2025-07-16 PROCEDURE — 1036F TOBACCO NON-USER: CPT | Performed by: INTERNAL MEDICINE

## 2025-07-16 NOTE — PROGRESS NOTES
"Subjective   Patient ID: Pia Flores is a 59 y.o. female who presents for f/u      Med Refill       Overall well   #1 POTS- overall well  #2 varicose veins-  no recent issues  #3 rotator cuff/biceps tendinitis- \"ok\"  #4 migraines  #5 rt posterior leg pain-    #6 increased LDL-  no issues.  Diet better.  #7 neck/dizziness- better after PT--> home exercises. repeat PT INB.   #8 cataracts-    #9 IFBS-  trying to eat better/exercuise.  Fasting.  Adding exercise    Review of Systems   All other systems reviewed and are negative.      Objective   /62 (BP Location: Right arm, Patient Position: Sitting)   Temp 36.4 °C (97.6 °F)   Wt 73.6 kg (162 lb 3.2 oz)   BMI 26.99 kg/m²     Physical Exam  Constitutional:       Appearance: Normal appearance.     Cardiovascular:      Rate and Rhythm: Normal rate and regular rhythm.      Pulses: Normal pulses.      Heart sounds: Normal heart sounds. No murmur heard.  Pulmonary:      Effort: Pulmonary effort is normal. No respiratory distress.      Breath sounds: Normal breath sounds. No wheezing, rhonchi or rales.     Neurological:      Mental Status: She is alert.     Psychiatric:         Mood and Affect: Mood normal.         Behavior: Behavior normal.         Thought Content: Thought content normal.         Judgment: Judgment normal.       Lab Results   Component Value Date    WBC 7.6 03/27/2025    HGB 13.4 03/27/2025    HCT 40.7 03/27/2025     03/27/2025    CHOL 188 03/27/2025    TRIG 55 03/27/2025    HDL 63 03/27/2025    ALT 16 03/27/2025    AST 18 03/07/2024     (L) 03/27/2025    K 4.6 03/27/2025    CL 99 03/27/2025    CREATININE 0.73 03/27/2025    BUN 12 03/27/2025    CO2 26 03/27/2025    TSH 1.90 07/19/2024    HGBA1C 5.8 (H) 03/27/2025       Assessment/Plan     #1 POTS-overall doing well.  f/u cards  #2 varicose veins- stable.  continue compression stockings.   #3 rotator cuff/biceps tendinitis-resolved.  #4 migraines- resolved  #5 rt posterior leg pain- " stable  #6 increased LDL- +fhx CAD, excellent CACS (=0 in 2018).  Labs.   Con't rx for now, c/s repeat CACS.    #7 neck/dizziness- better after PT--> home exercises. repeat PT INB.   #8 cataracts- s/p bl repair.   #9 IFBS- reviewed.  Labs.  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   f/u  6 mths

## 2025-07-30 ENCOUNTER — APPOINTMENT (OUTPATIENT)
Dept: OPHTHALMOLOGY | Age: 60
End: 2025-07-30
Payer: COMMERCIAL

## 2026-01-19 ENCOUNTER — APPOINTMENT (OUTPATIENT)
Dept: PRIMARY CARE | Facility: CLINIC | Age: 61
End: 2026-01-19
Payer: COMMERCIAL

## 2026-02-16 ENCOUNTER — APPOINTMENT (OUTPATIENT)
Dept: CARDIOLOGY | Facility: CLINIC | Age: 61
End: 2026-02-16
Payer: COMMERCIAL

## 2026-03-16 ENCOUNTER — APPOINTMENT (OUTPATIENT)
Dept: CARDIOLOGY | Facility: CLINIC | Age: 61
End: 2026-03-16
Payer: COMMERCIAL